# Patient Record
Sex: MALE | Race: WHITE | NOT HISPANIC OR LATINO | Employment: OTHER | URBAN - METROPOLITAN AREA
[De-identification: names, ages, dates, MRNs, and addresses within clinical notes are randomized per-mention and may not be internally consistent; named-entity substitution may affect disease eponyms.]

---

## 2017-01-11 DIAGNOSIS — I10 ESSENTIAL HYPERTENSION: ICD-10-CM

## 2017-01-11 RX ORDER — METOPROLOL SUCCINATE 100 MG/1
TABLET, EXTENDED RELEASE ORAL
Qty: 90 TAB | Refills: 3 | Status: SHIPPED | OUTPATIENT
Start: 2017-01-11 | End: 2017-12-20 | Stop reason: SDUPTHER

## 2017-02-16 ENCOUNTER — SLEEP CENTER VISIT (OUTPATIENT)
Dept: SLEEP MEDICINE | Facility: MEDICAL CENTER | Age: 72
End: 2017-02-16
Payer: MEDICARE

## 2017-02-16 VITALS
HEART RATE: 77 BPM | TEMPERATURE: 98.1 F | RESPIRATION RATE: 16 BRPM | BODY MASS INDEX: 30.23 KG/M2 | DIASTOLIC BLOOD PRESSURE: 86 MMHG | HEIGHT: 72 IN | WEIGHT: 223.2 LBS | OXYGEN SATURATION: 96 % | SYSTOLIC BLOOD PRESSURE: 143 MMHG

## 2017-02-16 DIAGNOSIS — I10 ESSENTIAL HYPERTENSION: ICD-10-CM

## 2017-02-16 DIAGNOSIS — G47.33 OSA (OBSTRUCTIVE SLEEP APNEA): ICD-10-CM

## 2017-02-16 PROCEDURE — 99214 OFFICE O/P EST MOD 30 MIN: CPT | Performed by: NURSE PRACTITIONER

## 2017-02-16 RX ORDER — CIPROFLOXACIN HYDROCHLORIDE 3.5 MG/ML
SOLUTION/ DROPS TOPICAL
COMMUNITY
Start: 2017-01-29 | End: 2018-05-25

## 2017-02-16 RX ORDER — PREDNISOLONE ACETATE 10 MG/ML
SUSPENSION/ DROPS OPHTHALMIC
COMMUNITY
Start: 2017-01-29 | End: 2018-05-25

## 2017-02-16 NOTE — PROGRESS NOTES
Chief Complaint   Patient presents with   • Follow-Up         HPI: This patient is a 71 y.o. male, who presents for annual follow-up of KAYLIE. Former PSG indicates severe KAYLIE with an RDI of 87.8, minimum oxygen saturation 75%. He was initially titrated on CPAP 6 cm H2O but pressures have been empirically increased. He is using CPAP 10 cm H2O, complaints, over the past 90 days shows 100% usage, average use per day of 8 hours and 15 minutes, AHI is been reduced to 5.2, no significant air leak. The patient states he continues to do well with his machine. He wakes feeling well rested. Sleeps well through the night, denies daytime hypersomnolence or morning headaches. He gets mask and supplies through [employer]. He believes he is due for a new machine this year, however His current machine is working well.    Past Medical History   Diagnosis Date   • HTN    • Dyslipidemia    • BPH    • chronic back pain    • Chronic neck pain    • KAYLIE (obstructive sleep apnea)      on CPAP   • Torn  meniscus 7/07     s/p arthroscopic surgery R knee10/07   • S/P carpal tunnel release      both wrists   • COPD (chronic obstructive pulmonary disease) (CMS-HCC)      mild   • OA (osteoarthritis) of knee      both knees   • Snoring    • Unspecified hemorrhagic conditions (CMS-[facility])      bruises easy   • Arrhythmia    • Impaired fasting blood sugar    • Groin pain, left lower quadrant 7/18/2013   • Arthritis      spine, thumbs   • Bronchitis 1980's   • Pneumonia 1988   • CATARACT      early cataracts both eyes   • Hypertension    • Gout    • Allergic rhinitis        Social History   Substance Use Topics   • Smoking status: Former Smoker -- 2.00 packs/day for 25 years     Types: Cigarettes     Quit date: 05/23/1988   • Smokeless tobacco: Never Used      Comment: quit 1998   • Alcohol Use: 0.0 oz/week     0 Standard drinks or equivalent per week      Comment: 1-2 glasses/day, beer on weekends       Family History   Problem Relation Age of Onset   •  "Cancer Mother      Leukemia       Current medications as of today   Current Outpatient Prescriptions   Medication Sig Dispense Refill   • ciprofloxacin (CILOXIN) 0.3 % Solution      • prednisoLONE acetate (PRED FORTE) 1 % Suspension      • metoprolol SR (TOPROL XL) 100 MG TABLET SR 24 HR TAKE 1 TABLET EVERY DAY 90 Tab 3   • fluticasone (FLONASE) 50 MCG/ACT nasal spray Spray 2 Sprays in nose every day. Each Nostril 3 Bottle 3   • amlodipine (NORVASC) 10 MG Tab Take 1 Tab by mouth every day. 90 Tab 3   • allopurinol (ZYLOPRIM) 300 MG Tab Take 1 Tab by mouth every day. 90 Tab 3   • atorvastatin (LIPITOR) 10 MG Tab Take 1 Tab by mouth every evening. 90 Tab 3   • losartan-hydrochlorothiazide (HYZAAR) 100-25 MG per tablet Take 1 Tab by mouth every day. 90 Tab 3   • valsartan-hydrochlorothiazide (DIOVAN-HCT) 320-12.5 MG per tablet Take 1 Tab by mouth every day.     • potassium chloride SA (K-DUR) 20 MEQ Tab CR TAKE 1 TABLET EVERY DAY 90 Tab 3   • metoprolol SR (TOPROL XL) 50 MG TABLET SR 24 HR Take 1 Tab by mouth every day. 90 Tab 3   • losartan-hydrochlorothiazide (HYZAAR) 100-12.5 MG per tablet Take 1 Tab by mouth every day. 90 Tab 3   • Multiple Vitamins-Minerals (OCUVITE ADULT 50+ PO) Take  by mouth every day.     • tramadol (ULTRAM) 50 MG TABS TAKE ONE TABLET BY MOUTH EVERY 4 HOURS AS NEEDED 30 Tab 0   • sildenafil citrate (VIAGRA) 100 MG tablet Take 1 Tab by mouth as needed for Erectile Dysfunction. 5 Tab 3     No current facility-administered medications for this visit.       Allergies: Nkda    Blood pressure 143/86, pulse 77, temperature 36.7 °C (98.1 °F), resp. rate 16, height 1.829 m (6' 0.01\"), weight 101.243 kg (223 lb 3.2 oz), SpO2 96 %.      ROS:   Constitutional: Denies fevers, chills, night sweats, weight loss or fatigue  HEENT: Denies earache, difficulty hearing, tinnitus, nasal congestion, hoarseness  Cardiovascular: Denies chest pain, tightness, palpitations, orthopnea or edema  Respiratory: Denies " cough, wheeze, dyspnea, hemoptysis  Sleep: See HPI  GI: Denies heartburn, dysphagia, nausea, abdominal pain, diarrhea or constipation  : Denies frequent urination, hematuria, discharge or painful urination  Musculoskeletal: Denies back pain, painful joints, sore muscles  Neurological: Denies weakness or headaches  Skin: No rashes    Physical exam:   Appearance: Well-nourished, well-developed, in no acute distress  HEENT: Normocephalic, atraumatic, white sclera, PERRLA  Respiratory: no intercostal retractions or accessory muscle use   Lungs auscultation: Clear to auscultation bilaterally  Cardiovascular: Regular rate rhythm no murmurs, rubs or gallops  Gait: Normal  Digits: No clubbing, cyanosis  Motor: No focal deficits  Orientation: Oriented to time, person and place    Diagnosis:  1. KAYLIE (obstructive sleep apnea)  DME MASK AND SUPPLIES   2. Essential hypertension     3. BMI 30.0-30.9,adult         Plan:  1. Rx for new mask and supplies  2. Clean mask and tubing weekly  3. Replace mask and supplies as insurance will allow  4. Follow up annually, sooner if needed

## 2017-02-16 NOTE — MR AVS SNAPSHOT
"        Sam Braswellmouna   2017 11:20 AM   Sleep Center Visit   MRN: [id number]    Department:  [facility]   Dept Phone:  [phone]    Description:  Male : 1945   Provider:  MARLEN Victor           Reason for Visit     Follow-Up           Allergies as of 2017     Allergen Noted Reactions    Nkda [No Known Drug Allergy] 2011         You were diagnosed with     KAYLIE (obstructive sleep apnea)   [959271]       Essential hypertension   [0515432]       BMI 30.0-30.9,adult   [802870]         Vital Signs     Blood Pressure Pulse Temperature Respirations Height Weight    143/86 mmHg 77 36.7 °C (98.1 °F) 16 1.829 m (6' 0.01\") 101.243 kg (223 lb 3.2 oz)    Body Mass Index Oxygen Saturation Smoking Status             30.26 kg/m2 96% Former Smoker         Basic Information     Date Of Birth Sex Race Ethnicity Preferred Language    1945 Male White Non- English      Your appointments     Mar 03, 2017  8:30 AM   Adult Draw/Collection with LAB [facility]   LAB - [place] (--)    [address]  [facility] [address] [phone]            Mar 08, 2017  1:00 PM   Established Patient with Bonnie Olsen M.D.   [facility] (--)    [address]  [address]  [facility] [address] [phone]           You will be receiving a confirmation call a few days before your appointment from our automated call confirmation system.              Problem List              ICD-10-CM Priority Class Noted - Resolved    Dyslipidemia E78.5   Unknown - Present    Chronic low back pain M54.5, G89.29   Unknown - Present    Chronic neck pain M54.2, G89.29   Unknown - Present    Hx of OA (Osteoarthritis) of Knee M17.9   Unknown - Present    KAYLIE (obstructive sleep apnea) G47.33   Unknown - Present    Torn meniscus S83.209A   Unknown - Present    S/P carpal tunnel release Z98.890   Unknown - Present    HTN (hypertension) I10   3/23/2010 - Present    Impaired fasting blood sugar " R73.01   3/7/2011 - Present    BPH (benign prostatic hyperplasia) N40.0   3/7/2011 - Present    Degeneration of lumbar or lumbosacral intervertebral disc M51.37   9/25/2012 - Present    Inguinal hernia K40.90   8/22/2013 - Present    Gout M10.9   5/8/2014 - Present    Essential hypertension I10   5/5/2016 - Present    BMI 30.0-30.9,adult Z68.30   2/16/2017 - Present      Health Maintenance        Date Due Completion Dates    IMM DTaP/Tdap/Td Vaccine (1 - Tdap) 7/12/2011 7/11/2011, 12/20/2004    COLONOSCOPY 10/12/2018 10/12/2016            Current Immunizations     13-VALENT PCV PREVNAR 5/5/2016    Hep A/HEP B Combined Vaccine (TwinRix) 6/14/2005, 12/20/2004    Hepatitis B Vaccine Non-Recombivax (Ped/Adol) 6/14/2005, 1/26/2005, 12/20/2004    Influenza TIV (IM) 10/4/2016, 10/4/2014, 10/18/2012    Influenza Vaccine 11/15/2011    Influenza Vaccine Adult HD 10/12/2015    Pneumococcal Vaccine (UF)Historical Data 12/20/2004    Pneumococcal polysaccharide vaccine (PPSV-23) 12/15/2011  8:11 PM    SHINGLES VACCINE 1/21/2013    TD Vaccine 7/11/2011  8:20 PM, 12/20/2004      Below and/or attached are the medications your provider expects you to take. Review all of your home medications and newly ordered medications with your provider and/or pharmacist. Follow medication instructions as directed by your provider and/or pharmacist. Please keep your medication list with you and share with your provider. Update the information when medications are discontinued, doses are changed, or new medications (including over-the-counter products) are added; and carry medication information at all times in the event of emergency situations     Allergies:  NKDA - (reactions not documented)               Medications  Valid as of: February 16, 2017 - 12:32 PM    Generic Name Brand Name Tablet Size Instructions for use    Allopurinol (Tab) ZYLOPRIM 300 MG Take 1 Tab by mouth every day.        AmLODIPine Besylate (Tab) NORVASC 10 MG Take 1 Tab by  mouth every day.        Atorvastatin Calcium (Tab) LIPITOR 10 MG Take 1 Tab by mouth every evening.        Ciprofloxacin HCl (Solution) CILOXIN 0.3 %         Fluticasone Propionate (Suspension) FLONASE 50 MCG/ACT Spray 2 Sprays in nose every day. Each Nostril        Losartan Potassium-HCTZ (Tab) HYZAAR 100-12.5 MG Take 1 Tab by mouth every day.        Losartan Potassium-HCTZ (Tab) HYZAAR 100-25 MG Take 1 Tab by mouth every day.        Metoprolol Succinate (TABLET SR 24 HR) TOPROL XL 50 MG Take 1 Tab by mouth every day.        Metoprolol Succinate (TABLET SR 24 HR) TOPROL  MG TAKE 1 TABLET EVERY DAY        Multiple Vitamins-Minerals   Take  by mouth every day.        Potassium Chloride Carla CR (Tab CR) Kdur 20 MEQ TAKE 1 TABLET EVERY DAY        PrednisoLONE Acetate (Suspension) PRED FORTE 1 %         Sildenafil Citrate (Tab) VIAGRA 100 MG Take 1 Tab by mouth as needed for Erectile Dysfunction.        TraMADol HCl (Tab) ULTRAM 50 MG TAKE ONE TABLET BY MOUTH EVERY 4 HOURS AS NEEDED        Valsartan-Hydrochlorothiazide (Tab) DIOVAN--12.5 MG Take 1 Tab by mouth every day.        .                 Medicines prescribed today were sent to:     [facility] - [place], [place] - [address]    [address] [address]    Phone: [phone] Fax: [phone]    Open 24 Hours?: No    [facility] [address] [place], [place] [address] [address] [address]    Phone: [phone] Fax: [phone]    Open 24 Hours?: No      Medication refill instructions:       If your prescription bottle indicates you have medication refills left, it is not necessary to call your provider’s office. Please contact your pharmacy and they will refill your medication.    If your prescription bottle indicates you do not have any refills left, you may request refills at any time through one of the following ways: The online [employer] system (except Urgent Care), by calling  your provider’s office, or by asking your pharmacy to contact your provider’s office with a refill request. Medication refills are processed only during regular business hours and may not be available until the next business day. Your provider may request additional information or to have a follow-up visit with you prior to refilling your medication.   *Please Note: Medication refills are assigned a new Rx number when refilled electronically. Your pharmacy may indicate that no refills were authorized even though a new prescription for the same medication is available at the pharmacy. Please request the medicine by name with the pharmacy before contacting your provider for a refill.        Instructions    1. Rx for new mask and supplies  2. Clean mask and tubing weekly  3. Replace mask and supplies as insurance will allow  4. Follow up annually, sooner if needed            [employer] Access Code: Activation code not generated  Current [employer] Status: Active

## 2017-02-16 NOTE — PATIENT INSTRUCTIONS
1. Rx for new mask and supplies  2. Clean mask and tubing weekly  3. Replace mask and supplies as insurance will allow  4. Follow up annually, sooner if needed

## 2017-02-23 ENCOUNTER — PATIENT OUTREACH (OUTPATIENT)
Dept: HEALTH INFORMATION MANAGEMENT | Facility: OTHER | Age: 72
End: 2017-02-23

## 2017-02-23 NOTE — PROGRESS NOTES
Attempt #:1     Annual Wellness Visit Scheduling  1. Scheduling Status:Not Scheduled. Patient states they see PCP regularly     Care Gap Scheduling      Health Maintenance Due   Topic Date Due   • IMM DTaP/Tdap/Td Vaccine (1 - Tdap) TD given on 7/11/2011   • Annual Wellness Visit  Declined         MyChart Activation: already active

## 2017-03-03 ENCOUNTER — HOSPITAL ENCOUNTER (OUTPATIENT)
Dept: LAB | Facility: MEDICAL CENTER | Age: 72
End: 2017-03-03
Attending: FAMILY MEDICINE
Payer: MEDICARE

## 2017-03-03 DIAGNOSIS — J30.2 OTHER SEASONAL ALLERGIC RHINITIS: ICD-10-CM

## 2017-03-03 DIAGNOSIS — I10 ESSENTIAL HYPERTENSION: ICD-10-CM

## 2017-03-03 DIAGNOSIS — M10.9 GOUT, ARTHRITIS: ICD-10-CM

## 2017-03-03 DIAGNOSIS — R73.01 IMPAIRED FASTING BLOOD SUGAR: ICD-10-CM

## 2017-03-03 DIAGNOSIS — E78.5 DYSLIPIDEMIA: ICD-10-CM

## 2017-03-03 LAB
ALBUMIN SERPL BCP-MCNC: 4.5 G/DL (ref 3.2–4.9)
ALBUMIN/GLOB SERPL: 1.5 G/DL
ALP SERPL-CCNC: 50 U/L (ref 30–99)
ALT SERPL-CCNC: 25 U/L (ref 2–50)
ANION GAP SERPL CALC-SCNC: 6 MMOL/L (ref 0–11.9)
AST SERPL-CCNC: 16 U/L (ref 12–45)
BILIRUB SERPL-MCNC: 1.1 MG/DL (ref 0.1–1.5)
BUN SERPL-MCNC: 18 MG/DL (ref 8–22)
CALCIUM SERPL-MCNC: 9.9 MG/DL (ref 8.5–10.5)
CHLORIDE SERPL-SCNC: 104 MMOL/L (ref 96–112)
CHOLEST SERPL-MCNC: 142 MG/DL (ref 100–199)
CO2 SERPL-SCNC: 29 MMOL/L (ref 20–33)
CREAT SERPL-MCNC: 0.8 MG/DL (ref 0.5–1.4)
EST. AVERAGE GLUCOSE BLD GHB EST-MCNC: 108 MG/DL
GLOBULIN SER CALC-MCNC: 3 G/DL (ref 1.9–3.5)
GLUCOSE SERPL-MCNC: 99 MG/DL (ref 65–99)
HBA1C MFR BLD: 5.4 % (ref 0–5.6)
HDLC SERPL-MCNC: 42 MG/DL
LDLC SERPL CALC-MCNC: 84 MG/DL
POTASSIUM SERPL-SCNC: 3.8 MMOL/L (ref 3.6–5.5)
PROT SERPL-MCNC: 7.5 G/DL (ref 6–8.2)
SODIUM SERPL-SCNC: 139 MMOL/L (ref 135–145)
TRIGL SERPL-MCNC: 78 MG/DL (ref 0–149)

## 2017-03-03 PROCEDURE — 83036 HEMOGLOBIN GLYCOSYLATED A1C: CPT

## 2017-03-03 PROCEDURE — 36415 COLL VENOUS BLD VENIPUNCTURE: CPT

## 2017-03-03 PROCEDURE — 80061 LIPID PANEL: CPT

## 2017-03-03 PROCEDURE — 80053 COMPREHEN METABOLIC PANEL: CPT

## 2017-03-08 ENCOUNTER — OFFICE VISIT (OUTPATIENT)
Dept: MEDICAL GROUP | Facility: PHYSICIAN GROUP | Age: 72
End: 2017-03-08
Payer: MEDICARE

## 2017-03-08 VITALS
DIASTOLIC BLOOD PRESSURE: 68 MMHG | HEART RATE: 68 BPM | OXYGEN SATURATION: 96 % | WEIGHT: 222 LBS | TEMPERATURE: 98.6 F | SYSTOLIC BLOOD PRESSURE: 132 MMHG | BODY MASS INDEX: 30.07 KG/M2 | RESPIRATION RATE: 18 BRPM | HEIGHT: 72 IN

## 2017-03-08 DIAGNOSIS — M10.9 GOUT, ARTHRITIS: ICD-10-CM

## 2017-03-08 DIAGNOSIS — E78.5 DYSLIPIDEMIA: ICD-10-CM

## 2017-03-08 DIAGNOSIS — I10 ESSENTIAL HYPERTENSION: ICD-10-CM

## 2017-03-08 DIAGNOSIS — Z12.5 SCREENING FOR PROSTATE CANCER: ICD-10-CM

## 2017-03-08 DIAGNOSIS — R73.01 IMPAIRED FASTING BLOOD SUGAR: ICD-10-CM

## 2017-03-08 PROCEDURE — 4040F PNEUMOC VAC/ADMIN/RCVD: CPT | Performed by: FAMILY MEDICINE

## 2017-03-08 PROCEDURE — G8432 DEP SCR NOT DOC, RNG: HCPCS | Performed by: FAMILY MEDICINE

## 2017-03-08 PROCEDURE — 99214 OFFICE O/P EST MOD 30 MIN: CPT | Performed by: FAMILY MEDICINE

## 2017-03-08 PROCEDURE — 3017F COLORECTAL CA SCREEN DOC REV: CPT | Performed by: FAMILY MEDICINE

## 2017-03-08 PROCEDURE — 1101F PT FALLS ASSESS-DOCD LE1/YR: CPT | Performed by: FAMILY MEDICINE

## 2017-03-08 PROCEDURE — G8419 CALC BMI OUT NRM PARAM NOF/U: HCPCS | Performed by: FAMILY MEDICINE

## 2017-03-08 PROCEDURE — G8482 FLU IMMUNIZE ORDER/ADMIN: HCPCS | Performed by: FAMILY MEDICINE

## 2017-03-08 PROCEDURE — 1036F TOBACCO NON-USER: CPT | Performed by: FAMILY MEDICINE

## 2017-03-08 ASSESSMENT — PATIENT HEALTH QUESTIONNAIRE - PHQ9: CLINICAL INTERPRETATION OF PHQ2 SCORE: 0

## 2017-03-08 NOTE — PROGRESS NOTES
"Subjective:      Sam Ayala is a 71 y.o. male who presents with follow-up of medical problems.          HPI     Patient is here for follow-up of his medical problems.    In terms of his hypertension, he continues to take his medications which are metoprolol, lisinopril/HCTZ, amlodipine. He has been monitoring his blood pressures at home and they have been running from 109-132/54-68. In the past we have seen fluctuations of his blood pressure reading with blood pressure running high associated increase in alcohol consumption. He said he has been avoiding alcohol use. He also has been watching his diet better. He and his wife vacation in [place] a few times a year and when they stay there he tends to consume more alcohol and not very careful with his diet. These usually result in elevation of the blood pressure when he comes home.    In terms of the Dyslipidemia, he continues to take atorvastatin without any side effects. Blood work was done before his visit.    We also follow him for repeat fasting blood sugar. He manages this by watching his diet.    In terms of his gout, he has not had any acute attack in a long time. He takes allopurinol. The last uric acid level was 4.6 in January 2016.    Past medical history, past surgical history, family history reviewed-no changes    Social history reviewed-no changes    Allergies reviewed-no changes    Medications reviewed-no changes    ROS     Review of systems as per history of present illness, the rest are negative.       Objective:     /68 mmHg  Pulse 68  Temp(Src) 37 °C (98.6 °F)  Resp 18  Ht 1.829 m (6' 0.01\")  Wt 100.699 kg (222 lb)  BMI 30.10 kg/m2  SpO2 96%     Physical Exam     Examined alert, awake, oriented, not in distress    Neck-supple, no lymphadenopathy, no thyromegaly  Lungs-clear to auscultation, no rales, no wheezes  Heart-regular rate and rhythm, no murmur  Extremities-no edema, clubbing, cyanosis     Hospital Outpatient Visit on " 03/03/2017   Component Date Value   • Cholesterol,Tot 03/03/2017 142    • Triglycerides 03/03/2017 78    • HDL 03/03/2017 42    • LDL 03/03/2017 84    • Sodium 03/03/2017 139    • Potassium 03/03/2017 3.8    • Chloride 03/03/2017 104    • Co2 03/03/2017 29    • Anion Gap 03/03/2017 6.0    • Glucose 03/03/2017 99    • Bun 03/03/2017 18    • Creatinine 03/03/2017 0.80    • Calcium 03/03/2017 9.9    • AST(SGOT) 03/03/2017 16    • ALT(SGPT) 03/03/2017 25    • Alkaline Phosphatase 03/03/2017 50    • Total Bilirubin 03/03/2017 1.1    • Albumin 03/03/2017 4.5    • Total Protein 03/03/2017 7.5    • Globulin 03/03/2017 3.0    • A-G Ratio 03/03/2017 1.5    • Glycohemoglobin 03/03/2017 5.4    • Est Avg Glucose 03/03/2017 108    • GFR If  03/03/2017 >60    • GFR If Non  Ameri* 03/03/2017 >60         Assessment/Plan:     1. Essential hypertension  Well-controlled on his medications. He has been avoiding alcohol and he has been more watchful with his diet. Advised to continue to follow healthy diet and to avoid alcohol including the times when he vacations in [place]. We will do follow-up blood work in 6 months.  - LIPID PROFILE; Future  - COMP METABOLIC PANEL; Future  - HEMOGLOBIN A1C; Future  - CBC WITH DIFFERENTIAL; Future  - PROSTATE SPECIFIC AG SCREENING; Future  - URIC ACID; Future    2. Dyslipidemia  All at target on atorvastatin. We will do follow-up blood work in 6 months.  - LIPID PROFILE; Future  - COMP METABOLIC PANEL; Future  - HEMOGLOBIN A1C; Future  - CBC WITH DIFFERENTIAL; Future  - PROSTATE SPECIFIC AG SCREENING; Future  - URIC ACID; Future    3. Impaired fasting blood sugar  His blood sugar is down to normal. His hemoglobin A1c is nondiabetic level and is normal. He will continue to watch his diet in terms of avoidance of sweets and decreasing the carbohydrates.  - LIPID PROFILE; Future  - COMP METABOLIC PANEL; Future  - HEMOGLOBIN A1C; Future  - CBC WITH DIFFERENTIAL; Future  - PROSTATE  SPECIFIC AG SCREENING; Future  - URIC ACID; Future    4. Gout, arthritis  He has not had a gout attack in a long time on allopurinol. We will do follow-up uric acid next visit.  - LIPID PROFILE; Future  - COMP METABOLIC PANEL; Future  - HEMOGLOBIN A1C; Future  - CBC WITH DIFFERENTIAL; Future  - PROSTATE SPECIFIC AG SCREENING; Future  - URIC ACID; Future    5. Screening for prostate cancer  We will do screening PSA with the next blood work.  - LIPID PROFILE; Future  - COMP METABOLIC PANEL; Future  - HEMOGLOBIN A1C; Future  - CBC WITH DIFFERENTIAL; Future  - PROSTATE SPECIFIC AG SCREENING; Future  - URIC ACID; Future      Please note that this dictation was created using voice recognition software. I have worked with consultants from the vendor as well as technical experts from "[employer]"[facility]  [employer] to optimize the interface. I have made every reasonable attempt to correct obvious errors, but I expect that there are errors of grammar and possibly content I did not discover before finalizing the note.

## 2017-03-08 NOTE — MR AVS SNAPSHOT
"Sam Ayala   3/8/2017 1:00 PM   Office Visit   MRN: [id number]    Department:  [facility]   Dept Phone:  [phone]    Description:  Male : 1945   Provider:  Bonnie Olsen M.D.           Allergies as of 3/8/2017     Allergen Noted Reactions    Nkda [No Known Drug Allergy] 2011         You were diagnosed with     Essential hypertension   [1088450]       Dyslipidemia   [924576]       Impaired fasting blood sugar   [043604]       Gout, arthritis   [024250]       Screening for prostate cancer   [723745]         Vital Signs     Blood Pressure Pulse Temperature Respirations Height Weight    132/68 mmHg 68 37 °C (98.6 °F) 18 1.829 m (6' 0.01\") 100.699 kg (222 lb)    Body Mass Index Oxygen Saturation Smoking Status             30.10 kg/m2 96% Former Smoker         Basic Information     Date Of Birth Sex Race Ethnicity Preferred Language    1945 Male White Non- English      Your appointments     Sep 26, 2017  1:00 PM   Established Patient with Bonnie Olsen M.D.   [facility] (--)    [address]  [address]  [facility] [id number] [phone]           You will be receiving a confirmation call a few days before your appointment from our automated call confirmation system.              Problem List              ICD-10-CM Priority Class Noted - Resolved    Dyslipidemia E78.5   Unknown - Present    Chronic low back pain M54.5, G89.29   Unknown - Present    Chronic neck pain M54.2, G89.29   Unknown - Present    Hx of OA (Osteoarthritis) of Knee M17.9   Unknown - Present    KAYLIE (obstructive sleep apnea) G47.33   Unknown - Present    Torn meniscus S83.209A   Unknown - Present    S/P carpal tunnel release Z98.890   Unknown - Present    HTN (hypertension) I10   3/23/2010 - Present    Impaired fasting blood sugar R73.01   3/7/2011 - Present    BPH (benign prostatic hyperplasia) N40.0   3/7/2011 - Present    Degeneration of lumbar or lumbosacral intervertebral disc " M51.37   9/25/2012 - Present    Inguinal hernia K40.90   8/22/2013 - Present    Gout M10.9   5/8/2014 - Present    Essential hypertension I10   5/5/2016 - Present    BMI 30.0-30.9,adult Z68.30   2/16/2017 - Present      Health Maintenance        Date Due Completion Dates    IMM DTaP/Tdap/Td Vaccine (1 - Tdap) 7/12/2011 7/11/2011, 12/20/2004    COLONOSCOPY 10/12/2018 10/12/2016            Current Immunizations     13-VALENT PCV PREVNAR 5/5/2016    Hep A/HEP B Combined Vaccine (TwinRix) 6/14/2005, 12/20/2004    Hepatitis B Vaccine Non-Recombivax (Ped/Adol) 6/14/2005, 1/26/2005, 12/20/2004    Influenza TIV (IM) 10/4/2016, 10/4/2014, 10/18/2012    Influenza Vaccine 11/15/2011    Influenza Vaccine Adult HD 10/12/2015    Pneumococcal Vaccine (UF)Historical Data 12/20/2004    Pneumococcal polysaccharide vaccine (PPSV-23) 12/15/2011  8:11 PM    SHINGLES VACCINE 1/21/2013    TD Vaccine 7/11/2011  8:20 PM, 12/20/2004      Below and/or attached are the medications your provider expects you to take. Review all of your home medications and newly ordered medications with your provider and/or pharmacist. Follow medication instructions as directed by your provider and/or pharmacist. Please keep your medication list with you and share with your provider. Update the information when medications are discontinued, doses are changed, or new medications (including over-the-counter products) are added; and carry medication information at all times in the event of emergency situations     Allergies:  NKDA - (reactions not documented)               Medications  Valid as of: March 08, 2017 -  1:31 PM    Generic Name Brand Name Tablet Size Instructions for use    Allopurinol (Tab) ZYLOPRIM 300 MG Take 1 Tab by mouth every day.        AmLODIPine Besylate (Tab) NORVASC 10 MG Take 1 Tab by mouth every day.        Atorvastatin Calcium (Tab) LIPITOR 10 MG Take 1 Tab by mouth every evening.        Ciprofloxacin HCl (Solution) CILOXIN 0.3 %          Fluticasone Propionate (Suspension) FLONASE 50 MCG/ACT Spray 2 Sprays in nose every day. Each Nostril        Losartan Potassium-HCTZ (Tab) HYZAAR 100-12.5 MG Take 1 Tab by mouth every day.        Losartan Potassium-HCTZ (Tab) HYZAAR 100-25 MG Take 1 Tab by mouth every day.        Metoprolol Succinate (TABLET SR 24 HR) TOPROL XL 50 MG Take 1 Tab by mouth every day.        Metoprolol Succinate (TABLET SR 24 HR) TOPROL  MG TAKE 1 TABLET EVERY DAY        Multiple Vitamins-Minerals   Take  by mouth every day.        Potassium Chloride Carla CR (Tab CR) Kdur 20 MEQ TAKE 1 TABLET EVERY DAY        PrednisoLONE Acetate (Suspension) PRED FORTE 1 %         Sildenafil Citrate (Tab) VIAGRA 100 MG Take 1 Tab by mouth as needed for Erectile Dysfunction.        TraMADol HCl (Tab) ULTRAM 50 MG TAKE ONE TABLET BY MOUTH EVERY 4 HOURS AS NEEDED        Valsartan-Hydrochlorothiazide (Tab) DIOVAN--12.5 MG Take 1 Tab by mouth every day.        .                 Medicines prescribed today were sent to:     [facility] - [facility] [address] [facility] [address] [facility]    [address] [facility] [address]    Phone: [phone] Fax: [phone]    Open 24 Hours?: No    [facility] [address] [place] [id number]    Phone: [phone] Fax: [phone]    Open 24 Hours?: No      Medication refill instructions:       If your prescription bottle indicates you have medication refills left, it is not necessary to call your provider’s office. Please contact your pharmacy and they will refill your medication.    If your prescription bottle indicates you do not have any refills left, you may request refills at any time through one of the following ways: The online [employer] system (except Urgent Care), by calling your provider’s office, or by asking your pharmacy to contact your provider’s office with a refill request. Medication refills are processed only during  regular business hours and may not be available until the next business day. Your provider may request additional information or to have a follow-up visit with you prior to refilling your medication.   *Please Note: Medication refills are assigned a new Rx number when refilled electronically. Your pharmacy may indicate that no refills were authorized even though a new prescription for the same medication is available at the pharmacy. Please request the medicine by name with the pharmacy before contacting your provider for a refill.        Your To Do List     Future Labs/Procedures Complete By Expires    CBC WITH DIFFERENTIAL  As directed 3/9/2018    COMP METABOLIC PANEL  As directed 3/9/2018    HEMOGLOBIN A1C  As directed 3/9/2018    LIPID PROFILE  As directed 3/9/2018    PROSTATE SPECIFIC AG SCREENING  As directed 3/9/2018    URIC ACID  As directed 3/9/2018         [employer] Access Code: Activation code not generated  Current [employer] Status: Active

## 2017-04-11 RX ORDER — POTASSIUM CHLORIDE 20 MEQ/1
TABLET, EXTENDED RELEASE ORAL
Qty: 90 TAB | Refills: 1 | Status: SHIPPED | OUTPATIENT
Start: 2017-04-11 | End: 2017-09-06 | Stop reason: SDUPTHER

## 2017-04-17 NOTE — PROGRESS NOTES
Attempt #:2    Verify PCP: yes    Communication Preference Obtained: yes     Review Care Team: yes    Annual Wellness Visit Scheduling  1. Scheduling Status:Not Scheduled. Patient states they are not interested       Care Gap Scheduling (Attempt to Schedule EACH Overdue Care Gap!)     Health Maintenance Due   Topic Date Due   • IMM DTaP/Tdap/Td Vaccine (1 - Tdap) 07/12/2011   • Annual Wellness Visit  08/31/2016         [employer] Activation: already active  [employer] Arnulfo: no  Virtual Visits: no  Opt In to Text Messages: no

## 2017-07-06 RX ORDER — LOSARTAN POTASSIUM AND HYDROCHLOROTHIAZIDE 25; 100 MG/1; MG/1
TABLET ORAL
Qty: 90 TAB | Refills: 3 | Status: SHIPPED | OUTPATIENT
Start: 2017-07-06 | End: 2018-08-28 | Stop reason: SDUPTHER

## 2017-07-06 RX ORDER — ALLOPURINOL 300 MG/1
TABLET ORAL
Qty: 90 TAB | Refills: 3 | Status: SHIPPED | OUTPATIENT
Start: 2017-07-06 | End: 2018-08-28 | Stop reason: SDUPTHER

## 2017-07-06 RX ORDER — FLUTICASONE PROPIONATE 50 MCG
SPRAY, SUSPENSION (ML) NASAL
Qty: 48 G | Refills: 3 | Status: SHIPPED | OUTPATIENT
Start: 2017-07-06 | End: 2018-08-28 | Stop reason: SDUPTHER

## 2017-07-06 RX ORDER — AMLODIPINE BESYLATE 10 MG/1
TABLET ORAL
Qty: 90 TAB | Refills: 3 | Status: SHIPPED | OUTPATIENT
Start: 2017-07-06 | End: 2018-08-28 | Stop reason: SDUPTHER

## 2017-10-05 ENCOUNTER — HOSPITAL ENCOUNTER (OUTPATIENT)
Dept: LAB | Facility: MEDICAL CENTER | Age: 72
End: 2017-10-05
Attending: FAMILY MEDICINE
Payer: MEDICARE

## 2017-10-05 DIAGNOSIS — I10 ESSENTIAL HYPERTENSION: ICD-10-CM

## 2017-10-05 DIAGNOSIS — M10.9 GOUT, ARTHRITIS: ICD-10-CM

## 2017-10-05 DIAGNOSIS — Z12.5 SCREENING FOR PROSTATE CANCER: ICD-10-CM

## 2017-10-05 DIAGNOSIS — E78.5 DYSLIPIDEMIA: ICD-10-CM

## 2017-10-05 DIAGNOSIS — R73.01 IMPAIRED FASTING BLOOD SUGAR: ICD-10-CM

## 2017-10-05 LAB
ALBUMIN SERPL BCP-MCNC: 4.4 G/DL (ref 3.2–4.9)
ALBUMIN/GLOB SERPL: 1.6 G/DL
ALP SERPL-CCNC: 56 U/L (ref 30–99)
ALT SERPL-CCNC: 31 U/L (ref 2–50)
ANION GAP SERPL CALC-SCNC: 8 MMOL/L (ref 0–11.9)
AST SERPL-CCNC: 21 U/L (ref 12–45)
BASOPHILS # BLD AUTO: 1 % (ref 0–1.8)
BASOPHILS # BLD: 0.06 K/UL (ref 0–0.12)
BILIRUB SERPL-MCNC: 0.7 MG/DL (ref 0.1–1.5)
BUN SERPL-MCNC: 13 MG/DL (ref 8–22)
CALCIUM SERPL-MCNC: 9.5 MG/DL (ref 8.5–10.5)
CHLORIDE SERPL-SCNC: 107 MMOL/L (ref 96–112)
CHOLEST SERPL-MCNC: 124 MG/DL (ref 100–199)
CO2 SERPL-SCNC: 28 MMOL/L (ref 20–33)
CREAT SERPL-MCNC: 0.8 MG/DL (ref 0.5–1.4)
EOSINOPHIL # BLD AUTO: 0.09 K/UL (ref 0–0.51)
EOSINOPHIL NFR BLD: 1.6 % (ref 0–6.9)
ERYTHROCYTE [DISTWIDTH] IN BLOOD BY AUTOMATED COUNT: 41.7 FL (ref 35.9–50)
EST. AVERAGE GLUCOSE BLD GHB EST-MCNC: 126 MG/DL
GFR SERPL CREATININE-BSD FRML MDRD: >60 ML/MIN/1.73 M 2
GLOBULIN SER CALC-MCNC: 2.8 G/DL (ref 1.9–3.5)
GLUCOSE SERPL-MCNC: 111 MG/DL (ref 65–99)
HBA1C MFR BLD: 6 % (ref 0–5.6)
HCT VFR BLD AUTO: 44.6 % (ref 42–52)
HDLC SERPL-MCNC: 42 MG/DL
HGB BLD-MCNC: 15.2 G/DL (ref 14–18)
IMM GRANULOCYTES # BLD AUTO: 0.01 K/UL (ref 0–0.11)
IMM GRANULOCYTES NFR BLD AUTO: 0.2 % (ref 0–0.9)
LDLC SERPL CALC-MCNC: 65 MG/DL
LYMPHOCYTES # BLD AUTO: 1.64 K/UL (ref 1–4.8)
LYMPHOCYTES NFR BLD: 28.3 % (ref 22–41)
MCH RBC QN AUTO: 30.7 PG (ref 27–33)
MCHC RBC AUTO-ENTMCNC: 34.1 G/DL (ref 33.7–35.3)
MCV RBC AUTO: 90.1 FL (ref 81.4–97.8)
MONOCYTES # BLD AUTO: 0.51 K/UL (ref 0–0.85)
MONOCYTES NFR BLD AUTO: 8.8 % (ref 0–13.4)
NEUTROPHILS # BLD AUTO: 3.49 K/UL (ref 1.82–7.42)
NEUTROPHILS NFR BLD: 60.1 % (ref 44–72)
NRBC # BLD AUTO: 0 K/UL
NRBC BLD AUTO-RTO: 0 /100 WBC
PLATELET # BLD AUTO: 240 K/UL (ref 164–446)
PMV BLD AUTO: 10.1 FL (ref 9–12.9)
POTASSIUM SERPL-SCNC: 4.1 MMOL/L (ref 3.6–5.5)
PROT SERPL-MCNC: 7.2 G/DL (ref 6–8.2)
PSA SERPL-MCNC: 1.12 NG/ML (ref 0–4)
RBC # BLD AUTO: 4.95 M/UL (ref 4.7–6.1)
SODIUM SERPL-SCNC: 143 MMOL/L (ref 135–145)
TRIGL SERPL-MCNC: 84 MG/DL (ref 0–149)
URATE SERPL-MCNC: 4.7 MG/DL (ref 2.5–8.3)
WBC # BLD AUTO: 5.8 K/UL (ref 4.8–10.8)

## 2017-10-05 PROCEDURE — 80053 COMPREHEN METABOLIC PANEL: CPT

## 2017-10-05 PROCEDURE — 85025 COMPLETE CBC W/AUTO DIFF WBC: CPT

## 2017-10-05 PROCEDURE — 36415 COLL VENOUS BLD VENIPUNCTURE: CPT

## 2017-10-05 PROCEDURE — 84550 ASSAY OF BLOOD/URIC ACID: CPT

## 2017-10-05 PROCEDURE — 80061 LIPID PANEL: CPT

## 2017-10-05 PROCEDURE — 83036 HEMOGLOBIN GLYCOSYLATED A1C: CPT | Mod: GA

## 2017-10-05 PROCEDURE — 84153 ASSAY OF PSA TOTAL: CPT | Mod: GA

## 2017-10-11 ENCOUNTER — OFFICE VISIT (OUTPATIENT)
Dept: MEDICAL GROUP | Facility: PHYSICIAN GROUP | Age: 72
End: 2017-10-11
Payer: MEDICARE

## 2017-10-11 VITALS
HEART RATE: 67 BPM | BODY MASS INDEX: 30.13 KG/M2 | WEIGHT: 222.44 LBS | OXYGEN SATURATION: 96 % | TEMPERATURE: 98 F | DIASTOLIC BLOOD PRESSURE: 60 MMHG | HEIGHT: 72 IN | SYSTOLIC BLOOD PRESSURE: 134 MMHG

## 2017-10-11 DIAGNOSIS — I10 ESSENTIAL HYPERTENSION: ICD-10-CM

## 2017-10-11 DIAGNOSIS — R73.01 IMPAIRED FASTING BLOOD SUGAR: ICD-10-CM

## 2017-10-11 DIAGNOSIS — E66.9 OBESITY (BMI 30-39.9): ICD-10-CM

## 2017-10-11 DIAGNOSIS — E78.5 DYSLIPIDEMIA: ICD-10-CM

## 2017-10-11 DIAGNOSIS — M10.9 GOUT, ARTHRITIS: ICD-10-CM

## 2017-10-11 PROCEDURE — 99214 OFFICE O/P EST MOD 30 MIN: CPT | Performed by: FAMILY MEDICINE

## 2017-10-11 NOTE — PROGRESS NOTES
Subjective:      Mel Maier is a 72 y.o. male who presents with Follow-Up            HPI     Patient returns for follow-up of his medical problems.    In terms of his hypertension, this is under control on metoprolol, lisinopril/HCTZ and amlodipine. He denies any side effects from the medication.    For his dyslipidemia, he continues to take atorvastatin without myalgias.    We continue to follow him for impaired fasting blood sugar. He said lately has been eating sweets.    In terms of the doubt, he has not had any acute flareup in a while. He is on allopurinol.    Patient already got his high-dose shot last month. He is up-to-date with all the rest of the shots.    Past medical history, past surgical history, family history reviewed-no changes    Social history reviewed-no changes    Allergies reviewed-no changes    Medications reviewed-no changes    ROS     Review of systems as per history of present illness, the rest are negative.       Objective:     /60   Pulse 67   Temp 36.7 °C (98 °F)   Ht 1.829 m (6')   Wt 100.9 kg (222 lb 7.1 oz)   SpO2 96%   BMI 30.17 kg/m²      Physical Exam      Examined alert, awake, oriented, not in distress    Neck-supple, no lymphadenopathy, no thyromegaly  Lungs-clear to auscultation, no rales, no wheezes  Heart-regular rate and rhythm, no murmur  Extremities-no edema, clubbing, cyanosis     Results for MEL MAIER (MRN [id number]) as of 10/11/2017 13:50   Ref. Range 3/3/2017 08:28 10/5/2017 07:40   Sodium Latest Ref Range: 135 - 145 mmol/L 139 143   Potassium Latest Ref Range: 3.6 - 5.5 mmol/L 3.8 4.1   Chloride Latest Ref Range: 96 - 112 mmol/L 104 107   Co2 Latest Ref Range: 20 - 33 mmol/L 29 28   Anion Gap Latest Ref Range: 0.0 - 11.9  6.0 8.0   Glucose Latest Ref Range: 65 - 99 mg/dL 99 111 (H)   Bun Latest Ref Range: 8 - 22 mg/dL 18 13   Creatinine Latest Ref Range: 0.50 - 1.40 mg/dL 0.80 0.80   GFR If  Latest Ref Range: >60  mL/min/1.73 m 2 >60 >60   GFR If Non  Latest Ref Range: >60 mL/min/1.73 m 2 >60 >60   Calcium Latest Ref Range: 8.5 - 10.5 mg/dL 9.9 9.5   AST(SGOT) Latest Ref Range: 12 - 45 U/L 16 21   ALT(SGPT) Latest Ref Range: 2 - 50 U/L 25 31   Alkaline Phosphatase Latest Ref Range: 30 - 99 U/L 50 56   Total Bilirubin Latest Ref Range: 0.1 - 1.5 mg/dL 1.1 0.7   Albumin Latest Ref Range: 3.2 - 4.9 g/dL 4.5 4.4   Total Protein Latest Ref Range: 6.0 - 8.2 g/dL 7.5 7.2   Globulin Latest Ref Range: 1.9 - 3.5 g/dL 3.0 2.8   A-G Ratio Latest Units: g/dL 1.5 1.6   Uric Acid Latest Ref Range: 2.5 - 8.3 mg/dL  4.7   Glycohemoglobin Latest Ref Range: 0.0 - 5.6 % 5.4 6.0 (H)   Estim. Avg Glu Latest Units: mg/dL 108 126   Cholesterol,Tot Latest Ref Range: 100 - 199 mg/dL 142 124   Triglycerides Latest Ref Range: 0 - 149 mg/dL 78 84   HDL Latest Ref Range: >=40 mg/dL 42 42   LDL Latest Ref Range: <100 mg/dL 84 65      Results for LIVIER MEL PADILLA (MRN [id number]) as of 10/11/2017 13:50   Ref. Range 4/17/2016 11:10 10/5/2017 07:40   WBC Latest Ref Range: 4.8 - 10.8 K/uL 6.5 5.8   RBC Latest Ref Range: 4.70 - 6.10 M/uL 5.18 4.95   Hemoglobin Latest Ref Range: 14.0 - 18.0 g/dL 15.3 15.2   Hematocrit Latest Ref Range: 42.0 - 52.0 % 44.7 44.6   MCV Latest Ref Range: 81.4 - 97.8 fL 86.3 90.1   MCH Latest Ref Range: 27.0 - 33.0 pg 29.5 30.7   MCHC Latest Ref Range: 33.7 - 35.3 g/dL 34.2 34.1   RDW Latest Ref Range: 35.9 - 50.0 fL 39.7 41.7   Platelet Count Latest Ref Range: 164 - 446 K/uL 234 240   MPV Latest Ref Range: 9.0 - 12.9 fL 9.5 10.1   Neutrophils-Polys Latest Ref Range: 44.00 - 72.00 % 64.60 60.10   Neutrophils (Absolute) Latest Ref Range: 1.82 - 7.42 K/uL 4.20 3.49   Lymphocytes Latest Ref Range: 22.00 - 41.00 % 25.40 28.30   Lymphs (Absolute) Latest Ref Range: 1.00 - 4.80 K/uL 1.65 1.64   Monocytes Latest Ref Range: 0.00 - 13.40 % 8.30 8.80   Monos (Absolute) Latest Ref Range: 0.00 - 0.85 K/uL 0.54 0.51   Eosinophils  Latest Ref Range: 0.00 - 6.90 % 0.80 1.60   Eos (Absolute) Latest Ref Range: 0.00 - 0.51 K/uL 0.05 0.09   Basophils Latest Ref Range: 0.00 - 1.80 % 0.60 1.00   Baso (Absolute) Latest Ref Range: 0.00 - 0.12 K/uL 0.04 0.06   Immature Granulocytes Latest Ref Range: 0.00 - 0.90 % 0.30 0.20   Immature Granulocytes (abs) Latest Ref Range: 0.00 - 0.11 K/uL 0.02 0.01   Nucleated RBC Latest Units: /100 WBC 0.00 0.00   NRBC (Absolute) Latest Units: K/uL 0.00 0.00   Results for MEL MAIER (MRN [id number]) as of 10/11/2017 13:50   Ref. Range 1/21/2016 09:09 10/5/2017 07:40   Prostatic Specific Antigen Tot Latest Ref Range: 0.00 - 4.00 ng/mL 0.78 1.12     Assessment/Plan:     1. Essential hypertension  Controlled on his medications.  - LIPID PROFILE; Future  - COMP METABOLIC PANEL; Future    2. Dyslipidemia  All at target on atorvastatin.  - LIPID PROFILE; Future  - COMP METABOLIC PANEL; Future    3. Impaired fasting blood sugar  Fasting blood sugar is borderline elevated but nondiabetic level. He has increased respiratory diabetes so he will need to avoid sweets and decrease the carbohydrates, exercise regularly and lose weight.  - LIPID PROFILE; Future  - COMP METABOLIC PANEL; Future    4. Gout, arthritis  Uric acid level below 6.0. Continue allopurinol.  - LIPID PROFILE; Future  - COMP METABOLIC PANEL; Future    5. Obesity (BMI 30-39.9)  Discussed diet, exercise and weight loss.  - Patient identified as having weight management issue.  Appropriate orders and counseling given.      Please note that this dictation was created using voice recognition software. I have worked with consultants from the vendor as well as technical experts from [employer] to optimize the interface. I have made every reasonable attempt to correct obvious errors, but I expect that there are errors of grammar and possibly content I did not discover before finalizing the note.

## 2018-03-30 RX ORDER — POTASSIUM CHLORIDE 20 MEQ/1
TABLET, EXTENDED RELEASE ORAL
Qty: 90 TAB | Refills: 1 | Status: SHIPPED | OUTPATIENT
Start: 2018-03-30 | End: 2018-08-28 | Stop reason: SDUPTHER

## 2018-04-12 ENCOUNTER — SLEEP CENTER VISIT (OUTPATIENT)
Dept: SLEEP MEDICINE | Facility: MEDICAL CENTER | Age: 73
End: 2018-04-12
Payer: MEDICARE

## 2018-04-12 VITALS
OXYGEN SATURATION: 95 % | SYSTOLIC BLOOD PRESSURE: 142 MMHG | HEIGHT: 72 IN | WEIGHT: 227 LBS | HEART RATE: 75 BPM | BODY MASS INDEX: 30.75 KG/M2 | DIASTOLIC BLOOD PRESSURE: 74 MMHG | RESPIRATION RATE: 15 BRPM

## 2018-04-12 DIAGNOSIS — G47.33 OSA (OBSTRUCTIVE SLEEP APNEA): ICD-10-CM

## 2018-04-12 PROCEDURE — 99213 OFFICE O/P EST LOW 20 MIN: CPT | Performed by: NURSE PRACTITIONER

## 2018-04-12 NOTE — PROGRESS NOTES
Chief Complaint   Patient presents with   • Follow-Up     Annual    • Apnea         HPI: This patient is a 73 y.o. male, who presents for annual follow-up of obstructive sleep apnea.     PSG indicates severe obstructive sleep apnea with an RDI of 87.8, minimum oxygen saturation of 75 %. he is compliant with CPAP 10 cm H2O. Compliance download over the past 30 days indicates 100 % compliance, average use of 7 hours 25 minutes per night, AHI of 7.3. Mask and pressures are comfortable. Patient reports continuing to benefit from therapy. He reports some daytime fatigue but attributes this to stress. He denies a.m. headaches. It's been many years since he had a titration study. His pressures have been empirically increase from 6-10.    Past Medical History:   Diagnosis Date   • Allergic rhinitis    • Arrhythmia    • Arthritis     spine, thumbs   • BPH    • Bronchitis 1980's   • CATARACT     early cataracts both eyes   • chronic back pain    • Chronic neck pain    • COPD (chronic obstructive pulmonary disease) ([facility])     mild   • Dyslipidemia    • Gout    • Groin pain, left lower quadrant 7/18/2013   • HTN    • Hypertension    • Impaired fasting blood sugar    • OA (osteoarthritis) of knee     both knees   • KAYLIE (obstructive sleep apnea)     on CPAP   • Pneumonia 1988   • S/P carpal tunnel release     both wrists   • Snoring    • Torn  meniscus 7/07    s/p arthroscopic surgery R knee10/07   • Unspecified hemorrhagic conditions     bruises easy       Social History   Substance Use Topics   • Smoking status: Former Smoker     Packs/day: 2.00     Years: 25.00     Types: Cigarettes     Quit date: 5/23/1988   • Smokeless tobacco: Never Used      Comment: quit 1998   • Alcohol use 0.0 oz/week      Comment: 1-2 glasses/day, beer on weekends       Family History   Problem Relation Age of Onset   • Cancer Mother      Leukemia       Current medications as of today   Current Outpatient Prescriptions   Medication Sig Dispense  Refill   • potassium chloride SA (KDUR) 20 MEQ Tab CR TAKE 1 TABLET EVERY DAY 90 Tab 1   • metoprolol SR (TOPROL XL) 100 MG TABLET SR 24 HR TAKE 1 TABLET EVERY DAY 90 Tab 1   • fluticasone (FLONASE) 50 MCG/ACT nasal spray USE 2 SPRAYS IN EACH NOSTRIL EVERY DAY 48 g 3   • amlodipine (NORVASC) 10 MG Tab TAKE 1 TABLET EVERY DAY 90 Tab 3   • losartan-hydrochlorothiazide (HYZAAR) 100-25 MG per tablet TAKE 1 TABLET EVERY DAY 90 Tab 3   • allopurinol (ZYLOPRIM) 300 MG Tab TAKE 1 TABLET EVERY DAY 90 Tab 3   • atorvastatin (LIPITOR) 10 MG Tab TAKE 1 TABLET EVERY EVENING 90 Tab 3   • ciprofloxacin (CILOXIN) 0.3 % Solution      • prednisoLONE acetate (PRED FORTE) 1 % Suspension      • valsartan-hydrochlorothiazide (DIOVAN-HCT) 320-12.5 MG per tablet Take 1 Tab by mouth every day.     • metoprolol SR (TOPROL XL) 50 MG TABLET SR 24 HR Take 1 Tab by mouth every day. 90 Tab 3   • losartan-hydrochlorothiazide (HYZAAR) 100-12.5 MG per tablet Take 1 Tab by mouth every day. 90 Tab 3   • tramadol (ULTRAM) 50 MG TABS TAKE ONE TABLET BY MOUTH EVERY 4 HOURS AS NEEDED 30 Tab 0   • sildenafil citrate (VIAGRA) 100 MG tablet Take 1 Tab by mouth as needed for Erectile Dysfunction. 5 Tab 3   • Multiple Vitamins-Minerals (OCUVITE ADULT 50+ PO) Take  by mouth every day.       No current facility-administered medications for this visit.        Allergies: Nkda [no known drug allergy]    Blood pressure 142/74, pulse 75, resp. rate 15, height 1.829 m (6'), weight 103 kg (227 lb), SpO2 95 %.      ROS: As per HPI and otherwise negative if not stated.      Physical exam:   Constitutional: Well-nourished, well-developed, in no acute distress  Eyes: PERRL    Neck: supple, trachea midline  Respiratory: no intercostal retractions or accessory muscle use   Lungs auscultation: Clear to auscultation bilaterally  Cardiovascular: Regular rate rhythm no murmurs, rubs or gallops  Musculoskeletal: no clubbing or cyanosis  Skin: No rashes or lesions noted on exposed  skin  Neuro: No focal deficit noted  Psychiatric: Oriented to time, person and place.     Diagnosis:  1. BMI 30.0-30.9,adult     2. KAYLIE (obstructive sleep apnea)  POLYSOMNOGRAPHY TITRATION       Plan:  1. Given the length of time since his last study, titration study is recommended. His pressures have been empirically increased however AHI remains mildly elevated and patient reports symptoms of fatigue.  2. He declines prescription of Ambien  3. Follow-up after titration to review results

## 2018-04-16 DIAGNOSIS — G47.33 OSA (OBSTRUCTIVE SLEEP APNEA): ICD-10-CM

## 2018-04-16 NOTE — TELEPHONE ENCOUNTER
[facility] into the [facility]. He states he declined a sleep aide for his sleep study, but has since changed his mind. He was wondering if an RX could be send to the [facility]?

## 2018-04-17 RX ORDER — ZOLPIDEM TARTRATE 5 MG/1
TABLET ORAL
Qty: 3 TAB | Refills: 0 | Status: SHIPPED
Start: 2018-04-17 | End: 2018-05-17

## 2018-05-01 ENCOUNTER — SLEEP STUDY (OUTPATIENT)
Dept: SLEEP MEDICINE | Facility: MEDICAL CENTER | Age: 73
End: 2018-05-01
Attending: NURSE PRACTITIONER
Payer: MEDICARE

## 2018-05-01 DIAGNOSIS — G47.33 OSA (OBSTRUCTIVE SLEEP APNEA): ICD-10-CM

## 2018-05-01 PROCEDURE — 95811 POLYSOM 6/>YRS CPAP 4/> PARM: CPT | Performed by: INTERNAL MEDICINE

## 2018-05-02 NOTE — PROCEDURES
Comments:  The patient underwent a diagnostic polysomnogram using the standard montage for measurement of parameters of sleep, respiratory events, movement abnormalities, and heart rate and rhythm.    A microphone was used to monitor snoring.  Interpretation:  Study start time was 10:16:48 PM.  Diagnostic recording time was 7h 18.0m with a total sleep time of 5h 17.5m resulting in a sleep efficiency of 72.49%%.    Sleep latency from the start of the study was 16 minutes and the latency from sleep to REM was 92 minutes.  In total,57  arousals were scored for an arousal index of 10.8.  Respiratory:  There were a total of 15 apneas consisting of 1 obstructive apneas, 0 mixed apneas, and 14 central apneas.  A total of 9 hypopneas were scored.  The apnea index was 2.83 per hour and the hypopnea index was 1.70 per hour resulting in an overall AHI of 4.54.  AHI during rem was 4.1 and AHI while supine was 5.36.  Oximetry:  There was a mean oxygen saturation of 91.0% with a minimum oxygen saturation of 81.0%.  Time spent with oxygen saturations below 89% was 7.4 minutes.  Cardiac:  The highest heart rate seen while awake was 94 BPM while the highest heart rate during sleep was 85 BPM with an average sleeping heart rate of 71 BPM.  Limb Movements:  There were a total of 675 PLMs during sleep, of which 8 were PLMS arousals.  This resulted in a PLMS index of 127.6 and a PLMS arousal index of 1.5.    CPAP was tried from 7 to 12 cm H2O.  BiPAP was tried from 12/8 to 14/10 cm H2O.    The sleep efficiency on CPAP was 72%.  Sleep architecture showed preserved sleep stages per sleep latency was normal at 16 minutes.  There were elevated PLM's with index 125.  EKG showed sinus rhythm    CPAP therapy was titrated to 12 cm of water however the patient had difficulty tolerating pressures and bilevel therapy was initiated.  There was very minimal sleep on BiPAP therapy.  At CPAP: 12 cm of water the AHI was 0 and oximetry was maintained at  93% per    Interpretation:  Technically challenging CPAP titration as patient had difficulty tolerating pressures of 12 cm of water.  Periodic limb movement disorder, which subsided on higher CPAP pressures.    Recommendations:  Consider CPAP: 12 cm of water allowing acclamation to therapy, potentially with the temporary use of a sleep aid.  If the patient cannot tolerate these pressure settings, then a bilevel titration would be recommended in the sleep laboratory.  The patient used a large [employer]wear fullface mask.

## 2018-05-21 ENCOUNTER — HOSPITAL ENCOUNTER (OUTPATIENT)
Dept: LAB | Facility: MEDICAL CENTER | Age: 73
End: 2018-05-21
Attending: FAMILY MEDICINE
Payer: MEDICARE

## 2018-05-21 DIAGNOSIS — M10.9 GOUT, ARTHRITIS: ICD-10-CM

## 2018-05-21 DIAGNOSIS — I10 ESSENTIAL HYPERTENSION: ICD-10-CM

## 2018-05-21 DIAGNOSIS — R73.01 IMPAIRED FASTING BLOOD SUGAR: ICD-10-CM

## 2018-05-21 DIAGNOSIS — E78.5 DYSLIPIDEMIA: ICD-10-CM

## 2018-05-21 LAB
ALBUMIN SERPL BCP-MCNC: 4.3 G/DL (ref 3.2–4.9)
ALBUMIN/GLOB SERPL: 1.7 G/DL
ALP SERPL-CCNC: 56 U/L (ref 30–99)
ALT SERPL-CCNC: 46 U/L (ref 2–50)
ANION GAP SERPL CALC-SCNC: 11 MMOL/L (ref 0–11.9)
AST SERPL-CCNC: 25 U/L (ref 12–45)
BILIRUB SERPL-MCNC: 1.2 MG/DL (ref 0.1–1.5)
BUN SERPL-MCNC: 13 MG/DL (ref 8–22)
CALCIUM SERPL-MCNC: 9.8 MG/DL (ref 8.5–10.5)
CHLORIDE SERPL-SCNC: 105 MMOL/L (ref 96–112)
CHOLEST SERPL-MCNC: 136 MG/DL (ref 100–199)
CO2 SERPL-SCNC: 27 MMOL/L (ref 20–33)
CREAT SERPL-MCNC: 0.82 MG/DL (ref 0.5–1.4)
GLOBULIN SER CALC-MCNC: 2.6 G/DL (ref 1.9–3.5)
GLUCOSE SERPL-MCNC: 90 MG/DL (ref 65–99)
HDLC SERPL-MCNC: 55 MG/DL
LDLC SERPL CALC-MCNC: 66 MG/DL
POTASSIUM SERPL-SCNC: 4 MMOL/L (ref 3.6–5.5)
PROT SERPL-MCNC: 6.9 G/DL (ref 6–8.2)
SODIUM SERPL-SCNC: 143 MMOL/L (ref 135–145)
TRIGL SERPL-MCNC: 74 MG/DL (ref 0–149)

## 2018-05-21 PROCEDURE — 36415 COLL VENOUS BLD VENIPUNCTURE: CPT

## 2018-05-21 PROCEDURE — 80053 COMPREHEN METABOLIC PANEL: CPT

## 2018-05-21 PROCEDURE — 80061 LIPID PANEL: CPT

## 2018-05-24 ENCOUNTER — OFFICE VISIT (OUTPATIENT)
Dept: MEDICAL GROUP | Facility: PHYSICIAN GROUP | Age: 73
End: 2018-05-24
Payer: MEDICARE

## 2018-05-24 VITALS
WEIGHT: 218.26 LBS | OXYGEN SATURATION: 92 % | SYSTOLIC BLOOD PRESSURE: 126 MMHG | HEART RATE: 74 BPM | BODY MASS INDEX: 29.56 KG/M2 | DIASTOLIC BLOOD PRESSURE: 50 MMHG | TEMPERATURE: 98.1 F | HEIGHT: 72 IN

## 2018-05-24 DIAGNOSIS — Z23 NEED FOR SHINGLES VACCINE: ICD-10-CM

## 2018-05-24 DIAGNOSIS — I10 ESSENTIAL HYPERTENSION: ICD-10-CM

## 2018-05-24 DIAGNOSIS — Z12.5 SCREENING FOR PROSTATE CANCER: ICD-10-CM

## 2018-05-24 DIAGNOSIS — M10.9 GOUT, ARTHRITIS: ICD-10-CM

## 2018-05-24 DIAGNOSIS — Z23 NEED FOR DIPHTHERIA-TETANUS-PERTUSSIS (TDAP) VACCINE: ICD-10-CM

## 2018-05-24 DIAGNOSIS — E78.5 DYSLIPIDEMIA: ICD-10-CM

## 2018-05-24 DIAGNOSIS — R73.01 IMPAIRED FASTING BLOOD SUGAR: ICD-10-CM

## 2018-05-24 PROCEDURE — 99214 OFFICE O/P EST MOD 30 MIN: CPT | Performed by: FAMILY MEDICINE

## 2018-05-24 ASSESSMENT — PATIENT HEALTH QUESTIONNAIRE - PHQ9: CLINICAL INTERPRETATION OF PHQ2 SCORE: 0

## 2018-05-26 NOTE — PROGRESS NOTES
Subjective:      Mel Maier is a 73 y.o. male who presents with Hypertension            HPI     Patient returns for follow-up of his medical problems.    In terms of his hypertension, this is well controlled on metoprolol, amlodipine, losartan/HCTZ. He said he has been more careful in terms of alcohol consumption, he has been watching the diet better, more active and he has lost 4 pounds since her last visit.    For the dyslipidemia, he continues to take atorvastatin without myalgias.    We continue to follow him for impaired fasting blood sugar and his diet.    In terms of his gout, he has not had any attack in a long time. He continues to take allopurinol.    Past medical history, past surgical history, family history reviewed-no changes    Social history reviewed-no changes    Allergies reviewed-no changes    Medications reviewed-no changes    ROS     As per history of present illness, the rest are negative.       Objective:     /50   Pulse 74   Temp 36.7 °C (98.1 °F)   Ht 1.829 m (6')   Wt 99 kg (218 lb 4.1 oz)   SpO2 92%   BMI 29.60 kg/m²      Physical Exam     Examined alert, awake, oriented, not in distress    Neck-supple, no lymphadenopathy, no thyromegaly  Lungs-clear to auscultation, no rales, no wheezes  Heart-regular rate and rhythm, no murmur  Extremities-no edema, clubbing, cyanosis       Results for MEL MAIER (MRN [id number]) as of 5/25/2018 19:14   Ref. Range 10/5/2017 07:40 5/21/2018 11:25   Sodium Latest Ref Range: 135 - 145 mmol/L 143 143   Potassium Latest Ref Range: 3.6 - 5.5 mmol/L 4.1 4.0   Chloride Latest Ref Range: 96 - 112 mmol/L 107 105   Co2 Latest Ref Range: 20 - 33 mmol/L 28 27   Anion Gap Latest Ref Range: 0.0 - 11.9  8.0 11.0   Glucose Latest Ref Range: 65 - 99 mg/dL 111 (H) 90   Bun Latest Ref Range: 8 - 22 mg/dL 13 13   Creatinine Latest Ref Range: 0.50 - 1.40 mg/dL 0.80 0.82   GFR If  Latest Ref Range: >60 mL/min/1.73 m 2 >60 >60   GFR  If Non  Latest Ref Range: >60 mL/min/1.73 m 2 >60 >60   Calcium Latest Ref Range: 8.5 - 10.5 mg/dL 9.5 9.8   AST(SGOT) Latest Ref Range: 12 - 45 U/L 21 25   ALT(SGPT) Latest Ref Range: 2 - 50 U/L 31 46   Alkaline Phosphatase Latest Ref Range: 30 - 99 U/L 56 56   Total Bilirubin Latest Ref Range: 0.1 - 1.5 mg/dL 0.7 1.2   Albumin Latest Ref Range: 3.2 - 4.9 g/dL 4.4 4.3   Total Protein Latest Ref Range: 6.0 - 8.2 g/dL 7.2 6.9   Globulin Latest Ref Range: 1.9 - 3.5 g/dL 2.8 2.6   A-G Ratio Latest Units: g/dL 1.6 1.7   Uric Acid Latest Ref Range: 2.5 - 8.3 mg/dL 4.7    Glycohemoglobin Latest Ref Range: 0.0 - 5.6 % 6.0 (H)    Estim. Avg Glu Latest Units: mg/dL 126    Cholesterol,Tot Latest Ref Range: 100 - 199 mg/dL 124 136   Triglycerides Latest Ref Range: 0 - 149 mg/dL 84 74   HDL Latest Ref Range: >=40 mg/dL 42 55   LDL Latest Ref Range: <100 mg/dL 65 66        Assessment/Plan:     1. Essential hypertension  His blood pressure has been running well controlled on his meds. He has done well in terms of watching the alcohol, watching his diet, being more active and losing weight. He is encouraged to continue with his efforts. Recheck blood work next visit.  - LIPID PROFILE; Future  - COMP METABOLIC PANEL; Future  - HEMOGLOBIN A1C; Future  - CBC WITH DIFFERENTIAL; Future  - VITAMIN D,25 HYDROXY; Future  - PROSTATE SPECIFIC AG SCREENING; Future    2. Dyslipidemia  Cholesterol panel came back all at target on atorvastatin.  - LIPID PROFILE; Future  - COMP METABOLIC PANEL; Future  - HEMOGLOBIN A1C; Future  - CBC WITH DIFFERENTIAL; Future  - VITAMIN D,25 HYDROXY; Future  - PROSTATE SPECIFIC AG SCREENING; Future    3. Impaired fasting blood sugar  Fasting blood sugar came back normal this time. He will continue to work with his diet, exercise and weight loss to keep this normal. Recheck blood work next visit.  - LIPID PROFILE; Future  - COMP METABOLIC PANEL; Future  - HEMOGLOBIN A1C; Future  - CBC WITH  DIFFERENTIAL; Future  - VITAMIN D,25 HYDROXY; Future  - PROSTATE SPECIFIC AG SCREENING; Future    4. Gout, arthritis  Continue allopurinol. Last uric acid level was less than 6.0. He has not had any attack in a long time.    5. Need for shingles vaccine  Discussed new shingles vaccine. He is agreeable to proceed and he was given prescription today from local drugstore. He will get a second dose in 2-6 months time.  - Zoster Vac Recomb Adjuvanted (SHINGRIX) 50 MCG Recon Susp; 0.5 mL by Intramuscular route Once for 1 dose.  Dispense: 0.5 mL; Refill: 0    6. Need for diphtheria-tetanus-pertussis (Tdap) vaccine  He also needs tdap. He was given prescription to get this from a local drugstore.  - tetanus-dipth-acell pertussis (ADACEL) 5-2-15.5 LF-MCG/0.5 Suspension; 0.5 mL by Intramuscular route Once PRN for up to 1 dose.  Dispense: 0.5 mL; Refill: 0    7. Screening for prostate cancer  Screening PSA ordered for the next blood work.  - LIPID PROFILE; Future  - COMP METABOLIC PANEL; Future  - HEMOGLOBIN A1C; Future  - CBC WITH DIFFERENTIAL; Future  - VITAMIN D,25 HYDROXY; Future  - PROSTATE SPECIFIC AG SCREENING; Future      Please note that this dictation was created using voice recognition software. I have worked with consultants from the vendor as well as technical experts from [facility]  [employer] to optimize the interface. I have made every reasonable attempt to correct obvious errors, but I expect that there are errors of grammar and possibly content I did not discover before finalizing the note.

## 2018-06-15 ENCOUNTER — SLEEP CENTER VISIT (OUTPATIENT)
Dept: SLEEP MEDICINE | Facility: MEDICAL CENTER | Age: 73
End: 2018-06-15
Payer: MEDICARE

## 2018-06-15 VITALS
HEIGHT: 72 IN | SYSTOLIC BLOOD PRESSURE: 144 MMHG | DIASTOLIC BLOOD PRESSURE: 85 MMHG | OXYGEN SATURATION: 98 % | RESPIRATION RATE: 15 BRPM | WEIGHT: 218 LBS | HEART RATE: 66 BPM | BODY MASS INDEX: 29.53 KG/M2

## 2018-06-15 DIAGNOSIS — I10 ESSENTIAL HYPERTENSION: ICD-10-CM

## 2018-06-15 DIAGNOSIS — G47.33 OSA (OBSTRUCTIVE SLEEP APNEA): ICD-10-CM

## 2018-06-15 PROCEDURE — 99213 OFFICE O/P EST LOW 20 MIN: CPT | Performed by: NURSE PRACTITIONER

## 2018-06-15 NOTE — PROGRESS NOTES
Chief Complaint   Patient presents with   • Results     SS         HPI: This patient is a 73 y.o. male, who presents for sleep study results.     PSG indicates severe obstructive sleep apnea with an RDI of 87.8, minimum oxygen saturation of 75 %. he is compliant with CPAP 10 cm H2O.  His pressures have been empirically increased over the years from 6-10 cm H2O.  He reported some EDS at his last visit.  Titration study was recommended. He did best on CPAP 12 cm H2O.    Past Medical History:   Diagnosis Date   • Allergic rhinitis    • Arrhythmia    • Arthritis     spine, thumbs   • BPH    • Bronchitis 1980's   • CATARACT     early cataracts both eyes   • chronic back pain    • Chronic neck pain    • COPD (chronic obstructive pulmonary disease) (HCC)     mild   • Dyslipidemia    • Gout    • Groin pain, left lower quadrant 7/18/2013   • HTN    • Hypertension    • Impaired fasting blood sugar    • OA (osteoarthritis) of knee     both knees   • KAYLIE (obstructive sleep apnea)     on CPAP   • Pneumonia 1988   • S/P carpal tunnel release     both wrists   • Snoring    • Torn  meniscus 7/07    s/p arthroscopic surgery R knee10/07   • Unspecified hemorrhagic conditions     bruises easy       Social History   Substance Use Topics   • Smoking status: Former Smoker     Packs/day: 2.00     Years: 25.00     Types: Cigarettes     Quit date: 5/23/1988   • Smokeless tobacco: Never Used      Comment: quit 1998   • Alcohol use 0.0 oz/week      Comment: 1-2 glasses/day, beer on weekends       Family History   Problem Relation Age of Onset   • Cancer Mother      Leukemia   • Sleep Apnea Neg Hx        Current medications as of today   Current Outpatient Prescriptions   Medication Sig Dispense Refill   • tetanus-dipth-acell pertussis (ADACEL) 5-2-15.5 LF-MCG/0.5 Suspension 0.5 mL by Intramuscular route Once PRN for up to 1 dose. 0.5 mL 0   • potassium chloride SA (KDUR) 20 MEQ Tab CR TAKE 1 TABLET EVERY DAY 90 Tab 1   • metoprolol SR (TOPROL  XL) 100 MG TABLET SR 24 HR TAKE 1 TABLET EVERY DAY 90 Tab 1   • fluticasone (FLONASE) 50 MCG/ACT nasal spray USE 2 SPRAYS IN EACH NOSTRIL EVERY DAY 48 g 3   • amlodipine (NORVASC) 10 MG Tab TAKE 1 TABLET EVERY DAY 90 Tab 3   • losartan-hydrochlorothiazide (HYZAAR) 100-25 MG per tablet TAKE 1 TABLET EVERY DAY 90 Tab 3   • allopurinol (ZYLOPRIM) 300 MG Tab TAKE 1 TABLET EVERY DAY 90 Tab 3   • atorvastatin (LIPITOR) 10 MG Tab TAKE 1 TABLET EVERY EVENING 90 Tab 3   • tramadol (ULTRAM) 50 MG TABS TAKE ONE TABLET BY MOUTH EVERY 4 HOURS AS NEEDED 30 Tab 0   • Multiple Vitamins-Minerals (OCUVITE ADULT 50+ PO) Take  by mouth every day.       No current facility-administered medications for this visit.        Allergies: Nkda [no known drug allergy]    Blood pressure 144/85, pulse 66, resp. rate 15, height 1.829 m (6'), weight 98.9 kg (218 lb), SpO2 98 %.      ROS: As per HPI and otherwise negative if not stated.      Physical exam:   Constitutional: Well-nourished, well-developed, in no acute distress  Eyes: PERRL  Neck: supple, trachea midline  Respiratory: no intercostal retractions or accessory muscle use   Lungs auscultation: Clear to auscultation bilaterally  Cardiovascular: Regular rate rhythm no murmurs, rubs or gallops  Musculoskeletal: no clubbing or cyanosis  Skin: No rashes or lesions noted on exposed skin  Neuro: No focal deficit noted  Psychiatric: Oriented to time, person and place.     Diagnosis:  1. KAYLIE (obstructive sleep apnea)  DME CPAP   2. Essential hypertension     3. BMI 29.0-29.9,adult         Plan:  Sleep testing was reviewed in detail with the patient.    1.  Patient CPAP is outdated, will order new CPAP through [facility] with new mask and supplies  2.  He will follow-up in 3 months for compliance download, sooner if needed

## 2018-06-18 DIAGNOSIS — I10 ESSENTIAL HYPERTENSION: ICD-10-CM

## 2018-06-18 RX ORDER — ATORVASTATIN CALCIUM 10 MG/1
TABLET, FILM COATED ORAL
Qty: 90 TAB | Refills: 3 | Status: SHIPPED | OUTPATIENT
Start: 2018-06-18 | End: 2018-06-25 | Stop reason: SDUPTHER

## 2018-06-18 RX ORDER — METOPROLOL SUCCINATE 100 MG/1
TABLET, EXTENDED RELEASE ORAL
Qty: 90 TAB | Refills: 3 | Status: SHIPPED | OUTPATIENT
Start: 2018-06-18 | End: 2018-06-25 | Stop reason: SDUPTHER

## 2018-06-25 DIAGNOSIS — I10 ESSENTIAL HYPERTENSION: ICD-10-CM

## 2018-06-25 RX ORDER — ATORVASTATIN CALCIUM 10 MG/1
TABLET, FILM COATED ORAL
Qty: 90 TAB | Refills: 3 | Status: SHIPPED | OUTPATIENT
Start: 2018-06-25 | End: 2019-06-15 | Stop reason: SDUPTHER

## 2018-06-25 RX ORDER — METOPROLOL SUCCINATE 100 MG/1
TABLET, EXTENDED RELEASE ORAL
Qty: 90 TAB | Refills: 3 | Status: SHIPPED | OUTPATIENT
Start: 2018-06-25 | End: 2019-06-15 | Stop reason: SDUPTHER

## 2018-08-28 RX ORDER — LOSARTAN POTASSIUM AND HYDROCHLOROTHIAZIDE 25; 100 MG/1; MG/1
TABLET ORAL
Qty: 90 TAB | Refills: 3 | Status: SHIPPED | OUTPATIENT
Start: 2018-08-28

## 2018-08-28 RX ORDER — AMLODIPINE BESYLATE 10 MG/1
TABLET ORAL
Qty: 90 TAB | Refills: 3 | Status: SHIPPED | OUTPATIENT
Start: 2018-08-28

## 2018-08-28 RX ORDER — POTASSIUM CHLORIDE 1500 MG/1
TABLET, EXTENDED RELEASE ORAL
Qty: 90 TAB | Refills: 3 | Status: SHIPPED | OUTPATIENT
Start: 2018-08-28

## 2018-08-28 RX ORDER — ALLOPURINOL 300 MG/1
TABLET ORAL
Qty: 90 TAB | Refills: 3 | Status: SHIPPED | OUTPATIENT
Start: 2018-08-28

## 2018-08-28 RX ORDER — FLUTICASONE PROPIONATE 50 MCG
SPRAY, SUSPENSION (ML) NASAL
Qty: 1 BOTTLE | Refills: 5 | Status: ON HOLD | OUTPATIENT
Start: 2018-08-28 | End: 2019-01-28 | Stop reason: CLARIF

## 2018-09-04 ENCOUNTER — APPOINTMENT (RX ONLY)
Dept: URBAN - METROPOLITAN AREA CLINIC 4 | Facility: CLINIC | Age: 73
Setting detail: DERMATOLOGY
End: 2018-09-04

## 2018-09-04 DIAGNOSIS — L81.4 OTHER MELANIN HYPERPIGMENTATION: ICD-10-CM

## 2018-09-04 DIAGNOSIS — D18.0 HEMANGIOMA: ICD-10-CM

## 2018-09-04 DIAGNOSIS — D36.1 BENIGN NEOPLASM OF PERIPHERAL NERVES AND AUTONOMIC NERVOUS SYSTEM: ICD-10-CM

## 2018-09-04 DIAGNOSIS — L82.1 OTHER SEBORRHEIC KERATOSIS: ICD-10-CM

## 2018-09-04 PROBLEM — I10 ESSENTIAL (PRIMARY) HYPERTENSION: Status: ACTIVE | Noted: 2018-09-04

## 2018-09-04 PROBLEM — D48.5 NEOPLASM OF UNCERTAIN BEHAVIOR OF SKIN: Status: ACTIVE | Noted: 2018-09-04

## 2018-09-04 PROBLEM — D18.01 HEMANGIOMA OF SKIN AND SUBCUTANEOUS TISSUE: Status: ACTIVE | Noted: 2018-09-04

## 2018-09-04 PROCEDURE — 11100: CPT

## 2018-09-04 PROCEDURE — ? BIOPSY BY SHAVE METHOD

## 2018-09-04 PROCEDURE — ? LIQUID NITROGEN

## 2018-09-04 PROCEDURE — 99213 OFFICE O/P EST LOW 20 MIN: CPT | Mod: 25

## 2018-09-04 ASSESSMENT — LOCATION ZONE DERM
LOCATION ZONE: ARM
LOCATION ZONE: TRUNK

## 2018-09-04 ASSESSMENT — LOCATION SIMPLE DESCRIPTION DERM
LOCATION SIMPLE: RIGHT UPPER BACK
LOCATION SIMPLE: RIGHT FOREARM
LOCATION SIMPLE: ABDOMEN
LOCATION SIMPLE: UPPER BACK

## 2018-09-04 ASSESSMENT — LOCATION DETAILED DESCRIPTION DERM
LOCATION DETAILED: RIGHT VENTRAL PROXIMAL FOREARM
LOCATION DETAILED: INFERIOR THORACIC SPINE
LOCATION DETAILED: RIGHT MEDIAL UPPER BACK
LOCATION DETAILED: PERIUMBILICAL SKIN

## 2018-09-04 NOTE — PROCEDURE: LIQUID NITROGEN
Include Z78.9 (Other Specified Conditions Influencing Health Status) As An Associated Diagnosis?: No
Detail Level: Detailed
Duration Of Freeze Thaw-Cycle (Seconds): 0
Consent: The patient's consent was obtained including but not limited to risks of crusting, scabbing, blistering, scarring, darker or lighter pigmentary change, recurrence, incomplete removal and infection.
Post-Care Instructions: I reviewed with the patient in detail post-care instructions. Patient is to wear sunprotection, and avoid picking at any of the treated lesions. Pt may apply Vaseline to crusted or scabbing areas.
Medical Necessity Clause: This procedure was medically necessary because the lesions that were treated were:
Medical Necessity Information: It is in your best interest to select a reason for this procedure from the list below. All of these items fulfill various CMS LCD requirements except the new and changing color options.

## 2018-09-04 NOTE — PROCEDURE: BIOPSY BY SHAVE METHOD
Billing Type: Third-Party Bill
Lab Facility: 
X Size Of Lesion In Cm: 0
Notification Instructions: Patient will be notified of biopsy results. However, patient instructed to call the office if not contacted within 2 weeks.
Post-Care Instructions: I reviewed with the patient in detail post-care instructions. Patient is to keep the biopsy site dry overnight, and then apply vasaline twice daily until healed.
Detail Level: Detailed
Anesthesia Type: 1% lidocaine with epinephrine and a 1:10 solution of 8.4% sodium bicarbonate
Type Of Destruction Used: Curettage
Biopsy Type: H and E
Anesthesia Volume In Cc: 0.5
Wound Care: Vaseline
Depth Of Biopsy: dermis
Electrodesiccation Text: The wound bed was treated with electrodesiccation after the biopsy was performed.
Curettage Text: The wound bed was treated with curettage after the biopsy was performed.
Biopsy Method: Personna blade
Bill For Surgical Tray: no
Lab: [id number]
Electrodesiccation And Curettage Text: The wound bed was treated with electrodesiccation and curettage after the biopsy was performed.
Hemostasis: Drysol and Electrocautery
Dressing: Band-Aid
Consent: Written consent was obtained and risks were reviewed including but not limited to scarring, infection, bleeding, scabbing, incomplete removal, nerve damage and allergy to anesthesia.
Was A Bandage Applied: Yes

## 2018-09-27 ENCOUNTER — SLEEP CENTER VISIT (OUTPATIENT)
Dept: SLEEP MEDICINE | Facility: MEDICAL CENTER | Age: 73
End: 2018-09-27
Payer: MEDICARE

## 2018-09-27 VITALS
HEIGHT: 72 IN | TEMPERATURE: 98.6 F | WEIGHT: 223 LBS | DIASTOLIC BLOOD PRESSURE: 66 MMHG | OXYGEN SATURATION: 77 % | SYSTOLIC BLOOD PRESSURE: 130 MMHG | BODY MASS INDEX: 30.2 KG/M2 | HEART RATE: 77 BPM | RESPIRATION RATE: 16 BRPM

## 2018-09-27 DIAGNOSIS — G47.33 OSA (OBSTRUCTIVE SLEEP APNEA): ICD-10-CM

## 2018-09-27 PROCEDURE — 99213 OFFICE O/P EST LOW 20 MIN: CPT | Performed by: NURSE PRACTITIONER

## 2018-09-27 RX ORDER — MELOXICAM 15 MG/1
15 TABLET ORAL
COMMUNITY
Start: 2018-09-12 | End: 2019-07-11

## 2018-09-27 ASSESSMENT — ENCOUNTER SYMPTOMS
EYE DISCHARGE: 0
NECK PAIN: 1
CONSTITUTIONAL NEGATIVE: 1
RESPIRATORY NEGATIVE: 1
EYE PAIN: 0
CARDIOVASCULAR NEGATIVE: 1
BRUISES/BLEEDS EASILY: 0

## 2018-09-27 NOTE — PROGRESS NOTES
Chief Complaint   Patient presents with   • Apnea     last seen 6/15/18         HPI: This patient is a 73 y.o. male, who presents for 3-month follow-up of KAYLIE.     PSG indicates severe obstructive sleep apnea with an RDI of 87.8, minimum oxygen saturation of 75 %. he is compliant with CPAP 12 cm H2O. Compliance download over the past 30 days indicates 100 % compliance, average use of 7 hours 5 minutes per night, AHI of 8.8.  No significant air leak.  Patient reports benefiting from therapy.  Denies EDS or a.m. headache.  Denies changes to his health since he was last seen.  He is planning a trip to [place] for the next 2 months.      Past Medical History:   Diagnosis Date   • Allergic rhinitis    • Arrhythmia    • Arthritis     spine, thumbs   • BPH    • Bronchitis 1980's   • CATARACT     early cataracts both eyes   • chronic back pain    • Chronic neck pain    • COPD (chronic obstructive pulmonary disease) (HCC)     mild   • Dyslipidemia    • Gout    • Groin pain, left lower quadrant 7/18/2013   • HTN    • Hypertension    • Impaired fasting blood sugar    • OA (osteoarthritis) of knee     both knees   • KAYLIE (obstructive sleep apnea)     on CPAP   • Pneumonia 1988   • S/P carpal tunnel release     both wrists   • Snoring    • Torn  meniscus 7/07    s/p arthroscopic surgery R knee10/07   • Unspecified hemorrhagic conditions     bruises easy       Social History   Substance Use Topics   • Smoking status: Former Smoker     Packs/day: 2.00     Years: 25.00     Types: Cigarettes     Quit date: 5/23/1988   • Smokeless tobacco: Never Used      Comment: quit 1998   • Alcohol use 0.0 oz/week      Comment: 1-2 glasses/day, beer on weekends       Family History   Problem Relation Age of Onset   • Cancer Mother         Leukemia   • Sleep Apnea Neg Hx        Immunization History   Administered Date(s) Administered   • Hep A/HEP B Combined Vaccine (TwinRix) 12/20/2004, 06/14/2005   • Hepatitis A Vaccine, Adult 12/20/2004   •  Hepatitis B Vaccine Non-Recombivax (Ped/Adol) 12/20/2004, 01/26/2005, 06/14/2005   • Influenza Seasonal Injectable 10/25/2011, 10/18/2012, 10/31/2013, 10/04/2014   • Influenza TIV (IM) 10/18/2012, 10/04/2014, 10/04/2016   • Influenza Vaccine 11/15/2011   • Influenza Vaccine Adult HD 10/09/2015, 10/04/2016, 09/30/2017, 09/26/2018   • Pneumococcal Conjugate Vaccine (Prevnar/PCV-13) 05/05/2016   • Pneumococcal Vaccine (UF)Historical Data 12/20/2004   • Pneumococcal polysaccharide vaccine (PPSV-23) 12/15/2011   • TD Vaccine 12/20/2004, 07/11/2011   • Tetanus Vaccine 07/11/2011   • Zoster Vaccine Live (ZVL) (Zostavax) 01/21/2013       Current medications as of today   Current Outpatient Prescriptions   Medication Sig Dispense Refill   • meloxicam (MOBIC) 15 MG tablet Take 15 mg by mouth every day.     • Multiple Vitamins-Minerals (EQ VISION FORMULA 50+ PO) Take  by mouth.     • FIBER PO Take  by mouth.     • fluticasone (FLONASE) 50 MCG/ACT nasal spray USE 2 SPRAYS IN EACH NOSTRIL EVERY DAY 1 Bottle 5   • allopurinol (ZYLOPRIM) 300 MG Tab TAKE 1 TABLET EVERY DAY 90 Tab 3   • losartan-hydrochlorothiazide (HYZAAR) 100-25 MG per tablet TAKE 1 TABLET EVERY DAY 90 Tab 3   • amLODIPine (NORVASC) 10 MG Tab TAKE 1 TABLET EVERY DAY 90 Tab 3   • KLOR-CON M20 20 MEQ Tab CR TAKE 1 TABLET EVERY DAY 90 Tab 3   • metoprolol SR (TOPROL XL) 100 MG TABLET SR 24 HR TAKE 1 TABLET EVERY DAY 90 Tab 3   • atorvastatin (LIPITOR) 10 MG Tab TAKE 1 TABLET EVERY EVENING 90 Tab 3   • tramadol (ULTRAM) 50 MG TABS TAKE ONE TABLET BY MOUTH EVERY 4 HOURS AS NEEDED 30 Tab 0   • Multiple Vitamins-Minerals (OCUVITE ADULT 50+ PO) Take  by mouth every day.     • tetanus-dipth-acell pertussis (ADACEL) 5-2-15.5 LF-MCG/0.5 Suspension 0.5 mL by Intramuscular route Once PRN for up to 1 dose. 0.5 mL 0     No current facility-administered medications for this visit.        Allergies: Nkda [no known drug allergy]    Blood pressure 130/66, pulse 77, temperature 37 °C  (98.6 °F), temperature source Temporal, resp. rate 16, height 1.829 m (6'), weight 101.2 kg (223 lb), SpO2 (!) 77 %.      Review of Systems   Constitutional: Negative.    HENT: Negative.    Eyes: Negative for pain and discharge.   Respiratory: Negative.    Cardiovascular: Negative.    Musculoskeletal: Positive for neck pain.   Skin: Negative.    Endo/Heme/Allergies: Negative for environmental allergies. Does not bruise/bleed easily.       Physical Exam   Constitutional: He is oriented to person, place, and time and well-developed, well-nourished, and in no distress.   HENT:   Head: Normocephalic and atraumatic.   Eyes: Pupils are equal, round, and reactive to light.   Neck: Normal range of motion. Neck supple. No tracheal deviation present.   Pulmonary/Chest: Effort normal.   Neurological: He is alert and oriented to person, place, and time.   Skin: Skin is warm and dry.   Psychiatric: Mood, memory, affect and judgment normal.   Vitals reviewed.      Diagnoses/Plan:    1. KAYLIE (obstructive sleep apnea)   Continue CPAP nightly, Clean mask & tubing weekly, Replace supplies as insurance will allow, RX for new supplies to DME      - DME Mask and Supplies    Follow-up annually, sooner if needed      This dictation was created using voice recognition software. The accuracy of the dictation is limited to the abilities of the software. I expect there may be some errors of grammar and possibly content.

## 2018-10-19 ENCOUNTER — HOSPITAL ENCOUNTER (OUTPATIENT)
Dept: LAB | Facility: MEDICAL CENTER | Age: 73
End: 2018-10-19
Attending: FAMILY MEDICINE
Payer: MEDICARE

## 2018-10-19 DIAGNOSIS — Z12.5 SCREENING FOR PROSTATE CANCER: ICD-10-CM

## 2018-10-19 DIAGNOSIS — I10 ESSENTIAL HYPERTENSION: ICD-10-CM

## 2018-10-19 DIAGNOSIS — E78.5 DYSLIPIDEMIA: ICD-10-CM

## 2018-10-19 DIAGNOSIS — R73.01 IMPAIRED FASTING BLOOD SUGAR: ICD-10-CM

## 2018-10-19 LAB
25(OH)D3 SERPL-MCNC: 24 NG/ML (ref 30–100)
ALBUMIN SERPL BCP-MCNC: 4.4 G/DL (ref 3.2–4.9)
ALBUMIN/GLOB SERPL: 1.7 G/DL
ALP SERPL-CCNC: 51 U/L (ref 30–99)
ALT SERPL-CCNC: 38 U/L (ref 2–50)
ANION GAP SERPL CALC-SCNC: 7 MMOL/L (ref 0–11.9)
AST SERPL-CCNC: 24 U/L (ref 12–45)
BASOPHILS # BLD AUTO: 0.8 % (ref 0–1.8)
BASOPHILS # BLD: 0.04 K/UL (ref 0–0.12)
BILIRUB SERPL-MCNC: 0.8 MG/DL (ref 0.1–1.5)
BUN SERPL-MCNC: 14 MG/DL (ref 8–22)
CALCIUM SERPL-MCNC: 9.8 MG/DL (ref 8.5–10.5)
CHLORIDE SERPL-SCNC: 107 MMOL/L (ref 96–112)
CHOLEST SERPL-MCNC: 149 MG/DL (ref 100–199)
CO2 SERPL-SCNC: 29 MMOL/L (ref 20–33)
CREAT SERPL-MCNC: 0.99 MG/DL (ref 0.5–1.4)
EOSINOPHIL # BLD AUTO: 0.06 K/UL (ref 0–0.51)
EOSINOPHIL NFR BLD: 1.2 % (ref 0–6.9)
ERYTHROCYTE [DISTWIDTH] IN BLOOD BY AUTOMATED COUNT: 45.5 FL (ref 35.9–50)
EST. AVERAGE GLUCOSE BLD GHB EST-MCNC: 126 MG/DL
FASTING STATUS PATIENT QL REPORTED: NORMAL
GLOBULIN SER CALC-MCNC: 2.6 G/DL (ref 1.9–3.5)
GLUCOSE SERPL-MCNC: 110 MG/DL (ref 65–99)
HBA1C MFR BLD: 6 % (ref 0–5.6)
HCT VFR BLD AUTO: 44.5 % (ref 42–52)
HDLC SERPL-MCNC: 51 MG/DL
HGB BLD-MCNC: 14.3 G/DL (ref 14–18)
IMM GRANULOCYTES # BLD AUTO: 0.01 K/UL (ref 0–0.11)
IMM GRANULOCYTES NFR BLD AUTO: 0.2 % (ref 0–0.9)
LDLC SERPL CALC-MCNC: 80 MG/DL
LYMPHOCYTES # BLD AUTO: 1.54 K/UL (ref 1–4.8)
LYMPHOCYTES NFR BLD: 31.8 % (ref 22–41)
MCH RBC QN AUTO: 30.3 PG (ref 27–33)
MCHC RBC AUTO-ENTMCNC: 32.1 G/DL (ref 33.7–35.3)
MCV RBC AUTO: 94.3 FL (ref 81.4–97.8)
MONOCYTES # BLD AUTO: 0.55 K/UL (ref 0–0.85)
MONOCYTES NFR BLD AUTO: 11.4 % (ref 0–13.4)
NEUTROPHILS # BLD AUTO: 2.64 K/UL (ref 1.82–7.42)
NEUTROPHILS NFR BLD: 54.6 % (ref 44–72)
NRBC # BLD AUTO: 0 K/UL
NRBC BLD-RTO: 0 /100 WBC
PLATELET # BLD AUTO: 238 K/UL (ref 164–446)
PMV BLD AUTO: 10.1 FL (ref 9–12.9)
POTASSIUM SERPL-SCNC: 3.8 MMOL/L (ref 3.6–5.5)
PROT SERPL-MCNC: 7 G/DL (ref 6–8.2)
PSA SERPL-MCNC: 1.47 NG/ML (ref 0–4)
RBC # BLD AUTO: 4.72 M/UL (ref 4.7–6.1)
SODIUM SERPL-SCNC: 143 MMOL/L (ref 135–145)
TRIGL SERPL-MCNC: 88 MG/DL (ref 0–149)
WBC # BLD AUTO: 4.8 K/UL (ref 4.8–10.8)

## 2018-10-19 PROCEDURE — 82306 VITAMIN D 25 HYDROXY: CPT

## 2018-10-19 PROCEDURE — 84153 ASSAY OF PSA TOTAL: CPT | Mod: GA

## 2018-10-19 PROCEDURE — 80053 COMPREHEN METABOLIC PANEL: CPT

## 2018-10-19 PROCEDURE — 80061 LIPID PANEL: CPT

## 2018-10-19 PROCEDURE — 83036 HEMOGLOBIN GLYCOSYLATED A1C: CPT | Mod: GA

## 2018-10-19 PROCEDURE — 36415 COLL VENOUS BLD VENIPUNCTURE: CPT

## 2018-10-19 PROCEDURE — 85025 COMPLETE CBC W/AUTO DIFF WBC: CPT

## 2018-10-24 ENCOUNTER — OFFICE VISIT (OUTPATIENT)
Dept: MEDICAL GROUP | Facility: PHYSICIAN GROUP | Age: 73
End: 2018-10-24
Payer: MEDICARE

## 2018-10-24 VITALS
DIASTOLIC BLOOD PRESSURE: 70 MMHG | TEMPERATURE: 97.6 F | SYSTOLIC BLOOD PRESSURE: 140 MMHG | HEIGHT: 72 IN | HEART RATE: 71 BPM | WEIGHT: 226.19 LBS | OXYGEN SATURATION: 94 % | BODY MASS INDEX: 30.64 KG/M2

## 2018-10-24 DIAGNOSIS — R73.01 IMPAIRED FASTING BLOOD SUGAR: ICD-10-CM

## 2018-10-24 DIAGNOSIS — Z23 NEED FOR DIPHTHERIA-TETANUS-PERTUSSIS (TDAP) VACCINE: ICD-10-CM

## 2018-10-24 DIAGNOSIS — I10 ESSENTIAL HYPERTENSION: ICD-10-CM

## 2018-10-24 DIAGNOSIS — E55.9 VITAMIN D DEFICIENCY: ICD-10-CM

## 2018-10-24 DIAGNOSIS — E78.5 DYSLIPIDEMIA: ICD-10-CM

## 2018-10-24 DIAGNOSIS — M10.9 GOUT, ARTHRITIS: ICD-10-CM

## 2018-10-24 PROCEDURE — 99214 OFFICE O/P EST MOD 30 MIN: CPT | Performed by: FAMILY MEDICINE

## 2018-10-25 NOTE — PROGRESS NOTES
Subjective:      Sam Ayala is a 73 y.o. male who presents with Hypertension            HPI     Patient returns for follow-up of his medical problems.    For his hypertension, this is under control on his medications which are losartan/HCTZ, metoprolol and amlodipine.  He takes KCl to avoid hypokalemia.  Patient denies side effects    In terms of impaired fasting blood sugar, he continues to manage this with his own efforts.    For the dyslipidemia, he continues to take atorvastatin without myalgias.    We follow him for gout.  He takes allopurinol regularly.  He has not had any gout attacks in a long time.    He was given Tdap prescription to get from a local drugstore but he has not received that yet.  He needs a new prescription.  He already had his flu shot.  He is still waiting for shingrix to be available in the local drugstore's.    Past medical history, past surgical history, family history reviewed-no changes    Social history reviewed-no changes    Allergies reviewed-no changes    Medications reviewed-no changes    ROS     As per HPI, the rest are negative for       Objective:     /70   Pulse 71   Temp 36.4 °C (97.6 °F) (Temporal)   Ht 1.829 m (6')   Wt 102.6 kg (226 lb 3.1 oz)   SpO2 94%   BMI 30.68 kg/m²      Physical Exam     Examined alert, awake, oriented, not in distress    Neck-supple, no lymphadenopathy, no thyromegaly  Lungs-clear to auscultation, no rales, no wheezes  Heart-regular rate and rhythm, no murmur  Extremities-no edema, clubbing, cyanosis       Hospital Outpatient Visit on 10/19/2018   Component Date Value   • Cholesterol,Tot 10/19/2018 149    • Triglycerides 10/19/2018 88    • HDL 10/19/2018 51    • LDL 10/19/2018 80    • Sodium 10/19/2018 143    • Potassium 10/19/2018 3.8    • Chloride 10/19/2018 107    • Co2 10/19/2018 29    • Anion Gap 10/19/2018 7.0    • Glucose 10/19/2018 110*   • Bun 10/19/2018 14    • Creatinine 10/19/2018 0.99    • Calcium 10/19/2018 9.8     • AST(SGOT) 10/19/2018 24    • ALT(SGPT) 10/19/2018 38    • Alkaline Phosphatase 10/19/2018 51    • Total Bilirubin 10/19/2018 0.8    • Albumin 10/19/2018 4.4    • Total Protein 10/19/2018 7.0    • Globulin 10/19/2018 2.6    • A-G Ratio 10/19/2018 1.7    • Glycohemoglobin 10/19/2018 6.0*   • Est Avg Glucose 10/19/2018 126    • WBC 10/19/2018 4.8    • RBC 10/19/2018 4.72    • Hemoglobin 10/19/2018 14.3    • Hematocrit 10/19/2018 44.5    • MCV 10/19/2018 94.3    • MCH 10/19/2018 30.3    • MCHC 10/19/2018 32.1*   • RDW 10/19/2018 45.5    • Platelet Count 10/19/2018 238    • MPV 10/19/2018 10.1    • Neutrophils-Polys 10/19/2018 54.60    • Lymphocytes 10/19/2018 31.80    • Monocytes 10/19/2018 11.40    • Eosinophils 10/19/2018 1.20    • Basophils 10/19/2018 0.80    • Immature Granulocytes 10/19/2018 0.20    • Nucleated RBC 10/19/2018 0.00    • Neutrophils (Absolute) 10/19/2018 2.64    • Lymphs (Absolute) 10/19/2018 1.54    • Monos (Absolute) 10/19/2018 0.55    • Eos (Absolute) 10/19/2018 0.06    • Baso (Absolute) 10/19/2018 0.04    • Immature Granulocytes (a* 10/19/2018 0.01    • NRBC (Absolute) 10/19/2018 0.00    • 25-Hydroxy   Vitamin D 25 10/19/2018 24*   • Prostatic Specific Antig* 10/19/2018 1.47    • Fasting Status 10/19/2018 Fasting    • GFR If  10/19/2018 >60    • GFR If Non  Ameri* 10/19/2018 >60         Assessment/Plan:     1. Essential hypertension  Controlled on his medications.  - HEMOGLOBIN A1C; Future  - LIPID PROFILE; Future  - COMP METABOLIC PANEL; Future  - VITAMIN D,25 HYDROXY; Future  - URIC ACID; Future    2. Impaired fasting blood sugar  Fasting blood sugar is still borderline elevated but nondiabetic level.  He also borderline elevated and prediabetic.  Advised to continue to avoid sweets and decrease the carbs.  Advised regular exercise and keeping a healthy weight  - HEMOGLOBIN A1C; Future  - LIPID PROFILE; Future  - COMP METABOLIC PANEL; Future  - VITAMIN D,25 HYDROXY;  Future  - URIC ACID; Future    3. Dyslipidemia  These are all at target.  Continue cholesterol medication.  - HEMOGLOBIN A1C; Future  - LIPID PROFILE; Future  - COMP METABOLIC PANEL; Future  - VITAMIN D,25 HYDROXY; Future  - URIC ACID; Future    4. Gout, arthritis  Continue allopurinol.  We will check uric acid level next visit.  Avoid alcohol which causes most of the gout flareup.  - HEMOGLOBIN A1C; Future  - LIPID PROFILE; Future  - COMP METABOLIC PANEL; Future  - VITAMIN D,25 HYDROXY; Future  - URIC ACID; Future    5. Vitamin D deficiency  Vitamin D is slightly low.  Start over-the-counter vitamin D 1000 international units daily and check vitamin D level next visit.  - HEMOGLOBIN A1C; Future  - LIPID PROFILE; Future  - COMP METABOLIC PANEL; Future  - VITAMIN D,25 HYDROXY; Future  - URIC ACID; Future    6. Need for diphtheria-tetanus-pertussis (Tdap) vaccine  Updated prescription given for Tdap.  - tetanus-dipth-acell pertussis (ADACEL) 5-2-15.5 LF-MCG/0.5 Suspension; 0.5 mL by Intramuscular route Once PRN for up to 1 dose.  Dispense: 0.5 mL; Refill: 0      Please note that this dictation was created using voice recognition software. I have worked with consultants from the vendor as well as technical experts from [employer] to optimize the interface. I have made every reasonable attempt to correct obvious errors, but I expect that there are errors of grammar and possibly content I did not discover before finalizing the note.

## 2019-01-28 ENCOUNTER — APPOINTMENT (OUTPATIENT)
Dept: RADIOLOGY | Facility: MEDICAL CENTER | Age: 74
End: 2019-01-28
Attending: EMERGENCY MEDICINE
Payer: MEDICARE

## 2019-01-28 ENCOUNTER — HOSPITAL ENCOUNTER (OUTPATIENT)
Facility: MEDICAL CENTER | Age: 74
End: 2019-01-28
Attending: EMERGENCY MEDICINE | Admitting: SURGERY
Payer: MEDICARE

## 2019-01-28 VITALS
OXYGEN SATURATION: 98 % | HEIGHT: 71 IN | RESPIRATION RATE: 15 BRPM | DIASTOLIC BLOOD PRESSURE: 57 MMHG | SYSTOLIC BLOOD PRESSURE: 128 MMHG | TEMPERATURE: 97.3 F | WEIGHT: 221.78 LBS | BODY MASS INDEX: 31.05 KG/M2 | HEART RATE: 66 BPM

## 2019-01-28 DIAGNOSIS — G89.18 PAIN FOLLOWING SURGERY OR PROCEDURE: ICD-10-CM

## 2019-01-28 LAB
ALBUMIN SERPL BCP-MCNC: 4.1 G/DL (ref 3.2–4.9)
ALBUMIN/GLOB SERPL: 1.7 G/DL
ALP SERPL-CCNC: 58 U/L (ref 30–99)
ALT SERPL-CCNC: 26 U/L (ref 2–50)
ANION GAP SERPL CALC-SCNC: 8 MMOL/L (ref 0–11.9)
APTT PPP: 36.6 SEC (ref 24.7–36)
AST SERPL-CCNC: 13 U/L (ref 12–45)
BASOPHILS # BLD AUTO: 0.1 % (ref 0–1.8)
BASOPHILS # BLD: 0.02 K/UL (ref 0–0.12)
BILIRUB SERPL-MCNC: 1.5 MG/DL (ref 0.1–1.5)
BUN SERPL-MCNC: 20 MG/DL (ref 8–22)
CALCIUM SERPL-MCNC: 9.7 MG/DL (ref 8.5–10.5)
CHLORIDE SERPL-SCNC: 103 MMOL/L (ref 96–112)
CO2 SERPL-SCNC: 24 MMOL/L (ref 20–33)
CREAT SERPL-MCNC: 0.92 MG/DL (ref 0.5–1.4)
EKG IMPRESSION: NORMAL
EOSINOPHIL # BLD AUTO: 0.09 K/UL (ref 0–0.51)
EOSINOPHIL NFR BLD: 0.5 % (ref 0–6.9)
ERYTHROCYTE [DISTWIDTH] IN BLOOD BY AUTOMATED COUNT: 42.8 FL (ref 35.9–50)
GLOBULIN SER CALC-MCNC: 2.4 G/DL (ref 1.9–3.5)
GLUCOSE SERPL-MCNC: 114 MG/DL (ref 65–99)
HCT VFR BLD AUTO: 45.9 % (ref 42–52)
HGB BLD-MCNC: 15.4 G/DL (ref 14–18)
IMM GRANULOCYTES # BLD AUTO: 0.09 K/UL (ref 0–0.11)
IMM GRANULOCYTES NFR BLD AUTO: 0.5 % (ref 0–0.9)
INR PPP: 1.06 (ref 0.87–1.13)
LIPASE SERPL-CCNC: 19 U/L (ref 11–82)
LYMPHOCYTES # BLD AUTO: 2.23 K/UL (ref 1–4.8)
LYMPHOCYTES NFR BLD: 12.2 % (ref 22–41)
MCH RBC QN AUTO: 30 PG (ref 27–33)
MCHC RBC AUTO-ENTMCNC: 33.6 G/DL (ref 33.7–35.3)
MCV RBC AUTO: 89.5 FL (ref 81.4–97.8)
MONOCYTES # BLD AUTO: 2.08 K/UL (ref 0–0.85)
MONOCYTES NFR BLD AUTO: 11.4 % (ref 0–13.4)
NEUTROPHILS # BLD AUTO: 13.8 K/UL (ref 1.82–7.42)
NEUTROPHILS NFR BLD: 75.3 % (ref 44–72)
NRBC # BLD AUTO: 0 K/UL
NRBC BLD-RTO: 0 /100 WBC
PATHOLOGY CONSULT NOTE: NORMAL
PLATELET # BLD AUTO: 215 K/UL (ref 164–446)
PMV BLD AUTO: 9.9 FL (ref 9–12.9)
POTASSIUM SERPL-SCNC: 3.7 MMOL/L (ref 3.6–5.5)
PROT SERPL-MCNC: 6.5 G/DL (ref 6–8.2)
PROTHROMBIN TIME: 13.9 SEC (ref 12–14.6)
RBC # BLD AUTO: 5.13 M/UL (ref 4.7–6.1)
SODIUM SERPL-SCNC: 135 MMOL/L (ref 135–145)
WBC # BLD AUTO: 18.3 K/UL (ref 4.8–10.8)

## 2019-01-28 PROCEDURE — 160036 HCHG PACU - EA ADDL 30 MINS PHASE I: Performed by: SURGERY

## 2019-01-28 PROCEDURE — 700111 HCHG RX REV CODE 636 W/ 250 OVERRIDE (IP)

## 2019-01-28 PROCEDURE — 160048 HCHG OR STATISTICAL LEVEL 1-5: Performed by: SURGERY

## 2019-01-28 PROCEDURE — 500868 HCHG NEEDLE, SURGI(VARES): Performed by: SURGERY

## 2019-01-28 PROCEDURE — 85025 COMPLETE CBC W/AUTO DIFF WBC: CPT

## 2019-01-28 PROCEDURE — 700101 HCHG RX REV CODE 250

## 2019-01-28 PROCEDURE — 700105 HCHG RX REV CODE 258: Performed by: EMERGENCY MEDICINE

## 2019-01-28 PROCEDURE — 36415 COLL VENOUS BLD VENIPUNCTURE: CPT

## 2019-01-28 PROCEDURE — 85610 PROTHROMBIN TIME: CPT

## 2019-01-28 PROCEDURE — 160009 HCHG ANES TIME/MIN: Performed by: SURGERY

## 2019-01-28 PROCEDURE — 502652 HCHG STAPLES, ETHICON ECR60: Performed by: SURGERY

## 2019-01-28 PROCEDURE — 160035 HCHG PACU - 1ST 60 MINS PHASE I: Performed by: SURGERY

## 2019-01-28 PROCEDURE — 96376 TX/PRO/DX INJ SAME DRUG ADON: CPT | Mod: XU

## 2019-01-28 PROCEDURE — 83690 ASSAY OF LIPASE: CPT

## 2019-01-28 PROCEDURE — 700102 HCHG RX REV CODE 250 W/ 637 OVERRIDE(OP): Performed by: ANESTHESIOLOGY

## 2019-01-28 PROCEDURE — 99285 EMERGENCY DEPT VISIT HI MDM: CPT

## 2019-01-28 PROCEDURE — 501582 HCHG TROCAR, THRD BLADED: Performed by: SURGERY

## 2019-01-28 PROCEDURE — G0378 HOSPITAL OBSERVATION PER HR: HCPCS

## 2019-01-28 PROCEDURE — 93005 ELECTROCARDIOGRAM TRACING: CPT | Performed by: SURGERY

## 2019-01-28 PROCEDURE — 501574 HCHG TROCAR, SMTH CAN&SEAL 5: Performed by: SURGERY

## 2019-01-28 PROCEDURE — A9270 NON-COVERED ITEM OR SERVICE: HCPCS | Performed by: ANESTHESIOLOGY

## 2019-01-28 PROCEDURE — 93010 ELECTROCARDIOGRAM REPORT: CPT | Performed by: INTERNAL MEDICINE

## 2019-01-28 PROCEDURE — 700111 HCHG RX REV CODE 636 W/ 250 OVERRIDE (IP): Performed by: ANESTHESIOLOGY

## 2019-01-28 PROCEDURE — 85730 THROMBOPLASTIN TIME PARTIAL: CPT

## 2019-01-28 PROCEDURE — 700105 HCHG RX REV CODE 258

## 2019-01-28 PROCEDURE — 501586 HCHG TROCAR, THRD SPIKE 5X55: Performed by: SURGERY

## 2019-01-28 PROCEDURE — 500800 HCHG LAPAROSCOPIC J/L HOOK: Performed by: SURGERY

## 2019-01-28 PROCEDURE — A6402 STERILE GAUZE <= 16 SQ IN: HCPCS | Performed by: SURGERY

## 2019-01-28 PROCEDURE — 80053 COMPREHEN METABOLIC PANEL: CPT

## 2019-01-28 PROCEDURE — 501399 HCHG SPECIMAN BAG, ENDO CATC: Performed by: SURGERY

## 2019-01-28 PROCEDURE — 700111 HCHG RX REV CODE 636 W/ 250 OVERRIDE (IP): Performed by: SURGERY

## 2019-01-28 PROCEDURE — 160039 HCHG SURGERY MINUTES - EA ADDL 1 MIN LEVEL 3: Performed by: SURGERY

## 2019-01-28 PROCEDURE — 700117 HCHG RX CONTRAST REV CODE 255: Performed by: EMERGENCY MEDICINE

## 2019-01-28 PROCEDURE — 160002 HCHG RECOVERY MINUTES (STAT): Performed by: SURGERY

## 2019-01-28 PROCEDURE — 501443 HCHG STAPLER, ROTIC. FLEX: Performed by: SURGERY

## 2019-01-28 PROCEDURE — 700111 HCHG RX REV CODE 636 W/ 250 OVERRIDE (IP): Performed by: EMERGENCY MEDICINE

## 2019-01-28 PROCEDURE — 700105 HCHG RX REV CODE 258: Performed by: SURGERY

## 2019-01-28 PROCEDURE — 502571 HCHG PACK, LAP CHOLE: Performed by: SURGERY

## 2019-01-28 PROCEDURE — 160028 HCHG SURGERY MINUTES - 1ST 30 MINS LEVEL 3: Performed by: SURGERY

## 2019-01-28 PROCEDURE — 88304 TISSUE EXAM BY PATHOLOGIST: CPT

## 2019-01-28 PROCEDURE — 501838 HCHG SUTURE GENERAL: Performed by: SURGERY

## 2019-01-28 PROCEDURE — 74177 CT ABD & PELVIS W/CONTRAST: CPT

## 2019-01-28 PROCEDURE — 96365 THER/PROPH/DIAG IV INF INIT: CPT | Mod: XU

## 2019-01-28 RX ORDER — OXYCODONE HCL 5 MG/5 ML
10 SOLUTION, ORAL ORAL
Status: COMPLETED | OUTPATIENT
Start: 2019-01-28 | End: 2019-01-28

## 2019-01-28 RX ORDER — HYDRALAZINE HYDROCHLORIDE 20 MG/ML
5 INJECTION INTRAMUSCULAR; INTRAVENOUS
Status: DISCONTINUED | OUTPATIENT
Start: 2019-01-28 | End: 2019-01-28 | Stop reason: HOSPADM

## 2019-01-28 RX ORDER — SODIUM CHLORIDE, SODIUM LACTATE, POTASSIUM CHLORIDE, CALCIUM CHLORIDE 600; 310; 30; 20 MG/100ML; MG/100ML; MG/100ML; MG/100ML
INJECTION, SOLUTION INTRAVENOUS CONTINUOUS
Status: DISCONTINUED | OUTPATIENT
Start: 2019-01-28 | End: 2019-01-28 | Stop reason: HOSPADM

## 2019-01-28 RX ORDER — HYDROCODONE BITARTRATE AND ACETAMINOPHEN 5; 325 MG/1; MG/1
1-2 TABLET ORAL EVERY 6 HOURS PRN
Status: DISCONTINUED | OUTPATIENT
Start: 2019-01-28 | End: 2019-01-28 | Stop reason: HOSPADM

## 2019-01-28 RX ORDER — MORPHINE SULFATE 4 MG/ML
4 INJECTION, SOLUTION INTRAMUSCULAR; INTRAVENOUS
Status: DISCONTINUED | OUTPATIENT
Start: 2019-01-28 | End: 2019-01-28 | Stop reason: HOSPADM

## 2019-01-28 RX ORDER — ONDANSETRON 2 MG/ML
4 INJECTION INTRAMUSCULAR; INTRAVENOUS
Status: COMPLETED | OUTPATIENT
Start: 2019-01-28 | End: 2019-01-28

## 2019-01-28 RX ORDER — FLUTICASONE PROPIONATE 50 MCG
1 SPRAY, SUSPENSION (ML) NASAL
COMMUNITY
End: 2019-02-18 | Stop reason: SDUPTHER

## 2019-01-28 RX ORDER — ONDANSETRON 4 MG/1
4 TABLET, ORALLY DISINTEGRATING ORAL
Status: DISCONTINUED | OUTPATIENT
Start: 2019-01-28 | End: 2019-01-28 | Stop reason: HOSPADM

## 2019-01-28 RX ORDER — IPRATROPIUM BROMIDE AND ALBUTEROL SULFATE 2.5; .5 MG/3ML; MG/3ML
3 SOLUTION RESPIRATORY (INHALATION)
Status: DISCONTINUED | OUTPATIENT
Start: 2019-01-28 | End: 2019-01-28 | Stop reason: HOSPADM

## 2019-01-28 RX ORDER — HYDROCODONE BITARTRATE AND ACETAMINOPHEN 5; 325 MG/1; MG/1
1-2 TABLET ORAL EVERY 6 HOURS PRN
Qty: 10 TAB | Refills: 0 | Status: SHIPPED | OUTPATIENT
Start: 2019-01-28 | End: 2019-02-02

## 2019-01-28 RX ORDER — DIPHENHYDRAMINE HYDROCHLORIDE 50 MG/ML
12.5 INJECTION INTRAMUSCULAR; INTRAVENOUS
Status: DISCONTINUED | OUTPATIENT
Start: 2019-01-28 | End: 2019-01-28 | Stop reason: HOSPADM

## 2019-01-28 RX ORDER — OXYCODONE HCL 5 MG/5 ML
5 SOLUTION, ORAL ORAL
Status: COMPLETED | OUTPATIENT
Start: 2019-01-28 | End: 2019-01-28

## 2019-01-28 RX ORDER — HALOPERIDOL 5 MG/ML
1 INJECTION INTRAMUSCULAR
Status: DISCONTINUED | OUTPATIENT
Start: 2019-01-28 | End: 2019-01-28 | Stop reason: HOSPADM

## 2019-01-28 RX ORDER — SODIUM CHLORIDE 9 MG/ML
INJECTION, SOLUTION INTRAVENOUS
Status: COMPLETED
Start: 2019-01-28 | End: 2019-01-28

## 2019-01-28 RX ORDER — MEPERIDINE HYDROCHLORIDE 25 MG/ML
12.5 INJECTION INTRAMUSCULAR; INTRAVENOUS; SUBCUTANEOUS
Status: DISCONTINUED | OUTPATIENT
Start: 2019-01-28 | End: 2019-01-28 | Stop reason: HOSPADM

## 2019-01-28 RX ORDER — SODIUM CHLORIDE 9 MG/ML
INJECTION, SOLUTION INTRAVENOUS CONTINUOUS
Status: DISCONTINUED | OUTPATIENT
Start: 2019-01-28 | End: 2019-01-28 | Stop reason: HOSPADM

## 2019-01-28 RX ORDER — HYDROMORPHONE HYDROCHLORIDE 1 MG/ML
0.2 INJECTION, SOLUTION INTRAMUSCULAR; INTRAVENOUS; SUBCUTANEOUS
Status: DISCONTINUED | OUTPATIENT
Start: 2019-01-28 | End: 2019-01-28 | Stop reason: HOSPADM

## 2019-01-28 RX ORDER — AMOXICILLIN 500 MG/1
2000 CAPSULE ORAL ONCE
Status: ON HOLD | COMMUNITY
End: 2019-01-28

## 2019-01-28 RX ORDER — HYDROMORPHONE HYDROCHLORIDE 1 MG/ML
0.5 INJECTION, SOLUTION INTRAMUSCULAR; INTRAVENOUS; SUBCUTANEOUS
Status: DISCONTINUED | OUTPATIENT
Start: 2019-01-28 | End: 2019-01-28 | Stop reason: HOSPADM

## 2019-01-28 RX ORDER — HYDROMORPHONE HYDROCHLORIDE 1 MG/ML
0.4 INJECTION, SOLUTION INTRAMUSCULAR; INTRAVENOUS; SUBCUTANEOUS
Status: DISCONTINUED | OUTPATIENT
Start: 2019-01-28 | End: 2019-01-28 | Stop reason: HOSPADM

## 2019-01-28 RX ORDER — BUPIVACAINE HYDROCHLORIDE 2.5 MG/ML
INJECTION, SOLUTION EPIDURAL; INFILTRATION; INTRACAUDAL
Status: DISCONTINUED | OUTPATIENT
Start: 2019-01-28 | End: 2019-01-28 | Stop reason: HOSPADM

## 2019-01-28 RX ORDER — SODIUM CHLORIDE, SODIUM LACTATE, POTASSIUM CHLORIDE, CALCIUM CHLORIDE 600; 310; 30; 20 MG/100ML; MG/100ML; MG/100ML; MG/100ML
INJECTION, SOLUTION INTRAVENOUS
Status: COMPLETED | OUTPATIENT
Start: 2019-01-28 | End: 2019-01-28

## 2019-01-28 RX ADMIN — PIPERACILLIN AND TAZOBACTAM 3.38 G: 3; .375 INJECTION, POWDER, LYOPHILIZED, FOR SOLUTION INTRAVENOUS; PARENTERAL at 04:24

## 2019-01-28 RX ADMIN — OXYCODONE HYDROCHLORIDE 5 MG: 5 SOLUTION ORAL at 10:16

## 2019-01-28 RX ADMIN — PIPERACILLIN AND TAZOBACTAM 3.38 G: 3; .375 INJECTION, POWDER, LYOPHILIZED, FOR SOLUTION INTRAVENOUS; PARENTERAL at 05:29

## 2019-01-28 RX ADMIN — SODIUM CHLORIDE: 9 INJECTION, SOLUTION INTRAVENOUS at 12:00

## 2019-01-28 RX ADMIN — PIPERACILLIN AND TAZOBACTAM 3.38 G: 3; .375 INJECTION, POWDER, LYOPHILIZED, FOR SOLUTION INTRAVENOUS; PARENTERAL at 13:18

## 2019-01-28 RX ADMIN — SODIUM CHLORIDE: 9 INJECTION, SOLUTION INTRAVENOUS at 05:45

## 2019-01-28 RX ADMIN — ONDANSETRON 4 MG: 2 INJECTION INTRAMUSCULAR; INTRAVENOUS at 10:16

## 2019-01-28 RX ADMIN — IOHEXOL 100 ML: 350 INJECTION, SOLUTION INTRAVENOUS at 03:53

## 2019-01-28 RX ADMIN — SODIUM CHLORIDE, POTASSIUM CHLORIDE, SODIUM LACTATE AND CALCIUM CHLORIDE: 600; 310; 30; 20 INJECTION, SOLUTION INTRAVENOUS at 05:29

## 2019-01-28 ASSESSMENT — LIFESTYLE VARIABLES
EVER FELT BAD OR GUILTY ABOUT YOUR DRINKING: NO
CONSUMPTION TOTAL: NEGATIVE
TOTAL SCORE: 0
TOTAL SCORE: 0
EVER HAD A DRINK FIRST THING IN THE MORNING TO STEADY YOUR NERVES TO GET RID OF A HANGOVER: NO
TOTAL SCORE: 0
ON A TYPICAL DAY WHEN YOU DRINK ALCOHOL HOW MANY DRINKS DO YOU HAVE: 2
DO YOU DRINK ALCOHOL: YES
EVER_SMOKED: NEVER
HAVE PEOPLE ANNOYED YOU BY CRITICIZING YOUR DRINKING: NO
HOW MANY TIMES IN THE PAST YEAR HAVE YOU HAD 5 OR MORE DRINKS IN A DAY: 0
HAVE YOU EVER FELT YOU SHOULD CUT DOWN ON YOUR DRINKING: NO
AVERAGE NUMBER OF DAYS PER WEEK YOU HAVE A DRINK CONTAINING ALCOHOL: 2

## 2019-01-28 ASSESSMENT — COGNITIVE AND FUNCTIONAL STATUS - GENERAL
DAILY ACTIVITIY SCORE: 24
MOBILITY SCORE: 24
SUGGESTED CMS G CODE MODIFIER DAILY ACTIVITY: CH
SUGGESTED CMS G CODE MODIFIER MOBILITY: CH

## 2019-01-28 ASSESSMENT — PATIENT HEALTH QUESTIONNAIRE - PHQ9
2. FEELING DOWN, DEPRESSED, IRRITABLE, OR HOPELESS: NOT AT ALL
SUM OF ALL RESPONSES TO PHQ9 QUESTIONS 1 AND 2: 0
1. LITTLE INTEREST OR PLEASURE IN DOING THINGS: NOT AT ALL

## 2019-01-28 ASSESSMENT — PAIN DESCRIPTION - DESCRIPTORS: DESCRIPTORS: DULL

## 2019-01-28 NOTE — PROGRESS NOTES
Pt arrived with transport in [facility], report given.  A+O x 4, VSS, and RA.   Pt scheduled for Lap Appy this AM, pre-op checklist complete.   Pt strict NPO.  +void  +BM  Pt ambulates self up to bathroom and down halls. Tolerates well.  Pt updated on POC and all questions answered at this time.  Bed in lowest position, call light within reach, and no current needs.

## 2019-01-28 NOTE — ED TRIAGE NOTES
Sam Ayala  73 y.o.  male  Chief Complaint   Patient presents with   • RLQ Pain     x 1.5 day     Present to triage c/o RLQ pain since yesterday. Denies N/V. Described as constant dull pain. Non radiating.

## 2019-01-28 NOTE — CARE PLAN
Problem: Safety  Goal: Will remain free from injury  Outcome: PROGRESSING AS EXPECTED  Patient educated to call for assistance before getting out of bed.  Call light within reach.      Problem: Infection  Goal: Will remain free from infection  Outcome: PROGRESSING AS EXPECTED  Patient given IV zosyn per MAR.

## 2019-01-28 NOTE — CARE PLAN
Problem: Communication  Goal: The ability to communicate needs accurately and effectively will improve  Outcome: PROGRESSING AS EXPECTED  Pt to go to surgery for a lap appy today. All questions answered at this time. Hourly rounding in place.    Problem: Safety  Goal: Will remain free from injury  Outcome: PROGRESSING AS EXPECTED  Proper fall precautions in place. Call light within reach and encouraged to use. Hourly rounding in practice.

## 2019-01-28 NOTE — H&P
DATE OF ADMISSION:  01/28/2019    IDENTIFICATION:  This is a 73-year-old male.    HISTORY OF PRESENT ILLNESS:  The patient was in his usual state of health   until 2 days ago when he started getting right lower quadrant abdominal pain.    It progressively worsened.  He had chills last night and finally was brought   to the emergency room for evaluation.  He was found to have a white count of   18,000 and a CT scan showing acute appendicitis.  I have been asked to see him   in regards to this.    PAST MEDICAL HISTORY:  Hypertension, gout, and hypercholesterolemia.    PAST SURGICAL HISTORY:  Knee replacement, a left inguinal hernia repair, 2   lumbar back surgeries, knee arthroscopy, bilateral carpal tunnel, and a   tonsillectomy.    MEDICATIONS:  Metoprolol, allopurinol, Lipitor, Hyzaar, Mobic, and Ultram.    ALLERGIES:  None.    SOCIAL HISTORY:  He is .  He a remote smoker, having stopped several   years ago.  He does drink occasionally.    REVIEW OF SYSTEMS:  Otherwise unremarkable.    PHYSICAL EXAMINATION:  VITAL SIGNS:  He is afebrile.  GENERAL:  He is anicteric.  NECK:  Supple.  ABDOMEN:  Soft with right lower quadrant abdominal pain.  He does not have   peritonitis.  EXTREMITIES:  Without edema.  NEUROLOGIC:  Intact.    LABORATORY DATA:  White count is 18,000.  Electrolytes are normal.  CT scan as   noted above.    IMPRESSION:  A 73-year-old male with acute appendicitis.    PLAN:  For laparoscopic appendectomy.  Procedure was described to the patient   as well as risks including bleeding, infection, conversion to an open   procedure and anesthetic risks.  He understands and wishes to proceed.       ____________________________________     YVONNE ACOSTA MD    [facility]    DD:  01/28/2019 09:09:49  DT:  01/28/2019 10:50:52    D#:  [id number]  Job#:  [id number]    cc: Bonnie Olsen MD

## 2019-01-28 NOTE — PROGRESS NOTES
Received report from evening RN.  Assumed care of patient.  Patient A&Ox4.  C/O pain 4/10 in RLQ abdomen. .  No nausea or vomiting.  No numbness/tingling.  Ambulating with a steady gait.  +void.  Skin intact.    Patient to pre-op with transport.  Wife at bedside.

## 2019-01-28 NOTE — PROGRESS NOTES
Med rec completed per pt at bedside  Allergies reviewed    Pt took 2000 mg of Amoxicillin 1/17/19 prior to a dental procedure

## 2019-01-28 NOTE — OP REPORT
DATE OF SERVICE:  01/28/2019    PREOPERATIVE DIAGNOSIS:  Acute appendicitis.    POSTOPERATIVE DIAGNOSIS:  Acute appendicitis.    PROCEDURE PERFORMED:    1.  Laparoscopic appendectomy.    2.  Umbilical hernia repair.    SURGEON:  Priscila Huggins MD    ASSISTANT:  BRAYAN Duncan    ANESTHESIA:  General endotracheal.    ANESTHESIOLOGIST:  Bryant Christiansen MD    INDICATIONS:  The patient is a 73-year-old gentleman who had presented with   2-day history of abdominal pain.  He was found to have on CT scan acute   appendicitis.  He is being brought at this time for laparoscopic appendectomy.        FINDINGS:    1.  Acute appendicitis that was necrotic, but there was no abscess.  The   appendectomy was performed.  2.  He had a 2 cm umbilical hernia that was also primarily repaired.    DESCRIPTION OF PROCEDURE:  After the patient was identified and consented, he   was brought to the operating room and placed in supine position.  Patient   underwent general endotracheal anesthetic clearance.  Patient's abdomen was   prepped and draped in sterile fashion.  Periumbilical area was anesthetized   with 0.25% Marcaine, he has a known umbilical hernia, which we will plan on   repairing when we close.  A 2 cm infraumbilical incision was carried out.  The   abdominal wall was lifted up and Veress needle was introduced into the   abdominal cavity.  After positive drop test, pneumoperitoneum was obtained.    The Veress needle was removed.  A 5-mm trocar was placed.  Under laparoscopic   guidance, a 5 mm trocars was placed in right subcostal position and 12 mm   trocar was placed in the suprapubic position.  ____ identified, but it was   walled off and appeared the patient not have perforated, but was incredibly   inflamed and scarred.  This was mobilized back to the base of the appendix,   was amputated with an [place] stapler, placed in an EndoCatch, and brought   through suprapubic port.  Appendiceal bed was irrigated and  hemostasis   secured.  Port sites removed and pneumoperitoneum released.  Upon closure, the   umbilical hernia sac was opened.  The fascia was then defined   circumferentially and closed with interrupted 0 Ethibond.  Subcutaneous tissue   was tacked down with 4-0 Vicryl, skin was closed with running 4-0 Vicryls.    All the incisions were closed with 4-0 Vicryls.  Op-Site dressing placed on   the wounds.  Patient was extubated and taken to recovery in stable condition.    All sponge and needle counts were correct.       ____________________________________     YVONNE ACOSTA MD    [facility]    DD:  01/28/2019 09:51:25  DT:  01/28/2019 11:53:44    D#:  [id number]  Job#:  [id number]    cc: Bonnie Olsen MD, Bryant Christiansen MD

## 2019-01-28 NOTE — ED PROVIDER NOTES
ED Provider Note    ED Provider Note      Primary care provider: Bonnie Olsen M.D.    CHIEF COMPLAINT  Chief Complaint   Patient presents with   • RLQ Pain     x 1.5 day       HPI  Sam Ayala is a 73 y.o. male who presents to the Emergency Department with chief complaint of right lower quadrant abdominal pain.  Patient reports that he has been having mild pain over the last 2 days more severe this evening.  Currently rates it as an 8 out of 10.  States that he began concerned that the pain was not going away that it was getting slightly worse and when he palpated the area and had worsening of the pain he was concerned that something may be going he has had minor nausea no fevers no chills no headache no cough congestion chest pain or shortness of breath.  He reports a sense of having to go to the bathroom frequently but not denies overt constipation or diarrhea no blood in his stool no urinary complaints no other symptoms or concerns at this time    REVIEW OF SYSTEMS  10 systems reviewed and otherwise negative, pertinent positives and negatives listed in the history of present illness.    PAST MEDICAL HISTORY   has a past medical history of Allergic rhinitis; Arrhythmia; Arthritis; BPH; Bronchitis (1980's); CATARACT; chronic back pain; Chronic neck pain; COPD (chronic obstructive pulmonary disease) (HCC); Dyslipidemia; Gout; Groin pain, left lower quadrant (7/18/2013); HTN; Hypertension; Impaired fasting blood sugar; OA (osteoarthritis) of knee; KAYLIE (obstructive sleep apnea); Pneumonia (1988); S/P carpal tunnel release; Snoring; Torn  meniscus (7/07); and Unspecified hemorrhagic conditions.    SURGICAL HISTORY   has a past surgical history that includes arthroscopy, knee; colonoscopy with polyp (5/09 ); colonoscopy with polyp (4/06); other orthopedic surgery; other orthopedic surgery (2009); other orthopedic surgery (1996); other orthopedic surgery (1997); other; lumbar laminectomy diskectomy (6/1/2011);  foraminotomy (6/1/2011); knee arthroplasty total (12/15/2011); lumbar decompression (9/25/2012); lumbar laminectomy diskectomy (9/25/2012); cyst excision (9/25/2012); colonoscopy with polyp (4/10/2013); inguinal hernia laparoscopic (8/22/2013); colonoscopy with polyp (10/12/16); cataract extraction with iol (left-2/7/17, right-2/21/17); and colonoscopy with polyp (01/10/2019).    SOCIAL HISTORY  Social History   Substance Use Topics   • Smoking status: Former Smoker     Packs/day: 2.00     Years: 25.00     Types: Cigarettes     Quit date: 5/23/1988   • Smokeless tobacco: Never Used      Comment: quit 1998   • Alcohol use 0.0 oz/week      Comment: 1-2 glasses/day, beer on weekends      History   Drug Use No       FAMILY HISTORY  Non-Contributory    CURRENT MEDICATIONS  Home Medications    **Home medications have not yet been reviewed for this encounter**         ALLERGIES  Allergies   Allergen Reactions   • Nkda [No Known Drug Allergy]        PHYSICAL EXAM  VITAL SIGNS: /77   Pulse 82   Temp 37 °C (98.6 °F) (Temporal)   Resp 16   Ht 1.829 m (6')   Wt 103.6 kg (228 lb 6.3 oz)   SpO2 94%   BMI 30.98 kg/m²   Pulse ox interpretation: I interpret this pulse ox as normal.  Constitutional: Alert and oriented x 3, minimal distress  HEENT: Atraumatic normocephalic, pupils are equal round reactive to light extraocular movements are intact. The nares is clear, external ears are normal, mouth shows moist mucous membranes  Neck: Supple, no JVD no tracheal deviation  Cardiovascular: Regular rate and rhythm no murmur rub or gallop 2+ pulses peripherally x4  Thorax & Lungs: No respiratory distress, no wheezes rales or rhonchi, No chest tenderness.   GI: Tender to palpation in the right lower quadrant no rebound no guarding no peritoneal signs negative Rovsing's negative heel tap and psoas signs  Skin: Warm dry no acute rash or lesion  Musculoskeletal: Moving all extremities with full range and 5 of 5 strength, no acute   deformity  Neurologic: Cranial nerves III through XII are grossly intact, no sensory deficit, no cerebellar dysfunction   Psychiatric: Appropriate affect for situation at this time      DIAGNOSTIC STUDIES / PROCEDURES  LABS      Results for orders placed or performed during the hospital encounter of 01/28/19   CBC WITH DIFFERENTIAL   Result Value Ref Range    WBC 18.3 (H) 4.8 - 10.8 K/uL    RBC 5.13 4.70 - 6.10 M/uL    Hemoglobin 15.4 14.0 - 18.0 g/dL    Hematocrit 45.9 42.0 - 52.0 %    MCV 89.5 81.4 - 97.8 fL    MCH 30.0 27.0 - 33.0 pg    MCHC 33.6 (L) 33.7 - 35.3 g/dL    RDW 42.8 35.9 - 50.0 fL    Platelet Count 215 164 - 446 K/uL    MPV 9.9 9.0 - 12.9 fL    Neutrophils-Polys 75.30 (H) 44.00 - 72.00 %    Lymphocytes 12.20 (L) 22.00 - 41.00 %    Monocytes 11.40 0.00 - 13.40 %    Eosinophils 0.50 0.00 - 6.90 %    Basophils 0.10 0.00 - 1.80 %    Immature Granulocytes 0.50 0.00 - 0.90 %    Nucleated RBC 0.00 /100 WBC    Neutrophils (Absolute) 13.80 (H) 1.82 - 7.42 K/uL    Lymphs (Absolute) 2.23 1.00 - 4.80 K/uL    Monos (Absolute) 2.08 (H) 0.00 - 0.85 K/uL    Eos (Absolute) 0.09 0.00 - 0.51 K/uL    Baso (Absolute) 0.02 0.00 - 0.12 K/uL    Immature Granulocytes (abs) 0.09 0.00 - 0.11 K/uL    NRBC (Absolute) 0.00 K/uL   COMP METABOLIC PANEL   Result Value Ref Range    Sodium 135 135 - 145 mmol/L    Potassium 3.7 3.6 - 5.5 mmol/L    Chloride 103 96 - 112 mmol/L    Co2 24 20 - 33 mmol/L    Anion Gap 8.0 0.0 - 11.9    Glucose 114 (H) 65 - 99 mg/dL    Bun 20 8 - 22 mg/dL    Creatinine 0.92 0.50 - 1.40 mg/dL    Calcium 9.7 8.5 - 10.5 mg/dL    AST(SGOT) 13 12 - 45 U/L    ALT(SGPT) 26 2 - 50 U/L    Alkaline Phosphatase 58 30 - 99 U/L    Total Bilirubin 1.5 0.1 - 1.5 mg/dL    Albumin 4.1 3.2 - 4.9 g/dL    Total Protein 6.5 6.0 - 8.2 g/dL    Globulin 2.4 1.9 - 3.5 g/dL    A-G Ratio 1.7 g/dL   LIPASE   Result Value Ref Range    Lipase 19 11 - 82 U/L   PROTHROMBIN TIME (INR)   Result Value Ref Range    PT 13.9 12.0 - 14.6 sec     INR 1.06 0.87 - 1.13   APTT   Result Value Ref Range    APTT 36.6 (H) 24.7 - 36.0 sec   ESTIMATED GFR   Result Value Ref Range    GFR If African American >60 >60 mL/min/1.73 m 2    GFR If Non African American >60 >60 mL/min/1.73 m 2       All labs reviewed by me.      RADIOLOGY  No orders to display     The radiologist's interpretation of all radiological studies have been reviewed by me.    COURSE & MEDICAL DECISION MAKING  Pertinent Labs & Imaging studies reviewed. (See chart for details)    2:52 AM - Patient seen and examined at bedside.     Patient noted to have slightly elevated blood pressure likely circumstantial secondary to presenting complaint. Referred to primary care physician for further evaluation.    Patient was given IV fluids based on n.p.o. status and surgical preparation, oral hydration was not attempted due n.p.o. status, after fluids had no change in symptoms    Medical Decision Making: Right lower quadrant pain white count of 18.3 CT scan shows severe strain and dilatation of the appendix without any free intraperitoneal air or evidence of rupture I discussed case with Dr. Huggins general surgeon patient placed on Zosyn given first dose in the emergency department and holding orders placed for maintenance fluids pain and nausea control Zosyn every 6 hours.  Admitted for appendectomy.    /77   Pulse 82   Temp 37 °C (98.6 °F) (Temporal)   Resp 16   Ht 1.829 m (6')   Wt 103.6 kg (228 lb 6.3 oz)   SpO2 94%   BMI 30.98 kg/m²             FINAL IMPRESSION  1.  Abdominal pain  2.  Leukocytosis  3.  Acute appendicitis      This dictation has been created using voice recognition software and/or scribes. The accuracy of the dictation is limited by the abilities of the software and the expertise of the scribes. I expect there may be some errors of grammar and possibly content. I made every attempt to manually correct the errors within my dictation. However, errors related to voice recognition  software and/or scribes may still exist and should be interpreted within the appropriate context.

## 2019-01-28 NOTE — PROGRESS NOTES
Patient arrived on unit from PACU.  Assumed care of patient.  Patient's lap stab sites x3 with dressings CDI.  On regular diet.  No nausea or vomiting.  No numbness/tingling.  Ambulating to bathroom SBA, +void.  C/O pain 4/10 in abdomen, ice packs in place. Plan of care discussed.  Call light within reach.

## 2019-01-29 NOTE — DISCHARGE INSTRUCTIONS
Discharge Instructions    Discharged to home by car with relative. Discharged via wheelchair, hospital escort: Yes.  Special equipment needed: Not Applicable    Be sure to schedule a follow-up appointment with your primary care doctor or any specialists as instructed.     Discharge Plan:   Diet Plan: Discussed  Activity Level: Discussed  Confirmed Follow up Appointment: Patient to Call and Schedule Appointment  Confirmed Symptoms Management: Discussed  Medication Reconciliation Updated: Yes  Influenza Vaccine Indication: Not indicated: Previously immunized this influenza season and > 8 years of age    I understand that a diet low in cholesterol, fat, and sodium is recommended for good health. Unless I have been given specific instructions below for another diet, I accept this instruction as my diet prescription.   Other diet: Regular    Special Instructions: None    · Is patient discharged on Warfarin / Coumadin?   No     Depression / Suicide Risk    As you are discharged from this [facility] facility, it is important to learn how to keep safe from harming yourself.    Recognize the warning signs:  · Abrupt changes in personality, positive or negative- including increase in energy   · Giving away possessions  · Change in eating patterns- significant weight changes-  positive or negative  · Change in sleeping patterns- unable to sleep or sleeping all the time   · Unwillingness or inability to communicate  · Depression  · Unusual sadness, discouragement and loneliness  · Talk of wanting to die  · Neglect of personal appearance   · Rebelliousness- reckless behavior  · Withdrawal from people/activities they love  · Confusion- inability to concentrate     If you or a loved one observes any of these behaviors or has concerns about self-harm, here's what you can do:  · Talk about it- your feelings and reasons for harming yourself  · Remove any means that you might use to hurt yourself (examples: pills, rope, extension  cords, firearm)  · Get professional help from the community (Mental Health, Substance Abuse, psychological counseling)  · Do not be alone:Call your Safe Contact- someone whom you trust who will be there for you.  · Call your local CRISIS HOTLINE 439-8225 or 984-205-9588  · Call your local Children's Mobile Crisis Response Team [place] (027) 803-9598 or www.[employer]  · Call the toll free National Suicide Prevention Hotlines   · National Suicide Prevention Lifeline 188-493-TUYA (1953)  · [employer] 800-SUICIDE (957-1715)          Laparoscopic Appendectomy D/C instructions:     DIET: Upon discharge from the hospital you may resume your normal pre-operative diet. Depending on how you are feeling and whether you have nausea or not, you may wish to stay with a bland diet for the first few days. However, you can advance this as quickly as you feel ready.     ACTIVITIES: After discharge from the hospital, you may resume full routine activities. However, there should be no heavy lifting (greater than 15 pounds) and no strenuous activities until after your follow-up visit. Otherwise, routine activities of daily living are acceptable.     DRIVING: You may drive whenever you are off pain medications and are able to perform the activities needed to drive, i.e. turning, bending, twisting, etc.     BATHING: You may get the wound wet at any time after leaving the hospital. You may shower, but do not submerge in a bath for at least a week. Dressings may come off after 48 hours.     BOWEL FUNCTION: A few patients, after this operation, will develop either frequent or loose stools after meals. This usually corrects itself after a few days, to a few weeks. If this occurs, do not worry; it is not unusual and will resolve. Much more common than loose stools, is constipation. The combination of pain medication and decreased activity level can cause constipation in otherwise normal patients. If you feel this  is occurring, take a laxative (Milk of Magnesia, Ex-Lax, Senokot, etc.) until the problem has resolved.     PAIN MEDICATION: You will be given a prescription for pain medication at discharge. Please take these as directed. It is important to remember not to take medications on an empty stomach as this may cause nausea.     CALL IF YOU HAVE: (1) Fevers to more than 1010 F, (2) Unusual chest or leg pain, (3) Drainage or fluid from incision that may be foul smelling, increased tenderness or soreness at the wound or the wound edges are no longer together, redness or swelling at the incision site. Please do not hesitate to call with any other questions.     APPOINTMENT: Contact our office at [phone] for a follow-up appointment in 1 week following your procedure.     If you have any additional questions, please do not hesitate to call the office.     Office address:   [address] [place] [address], [address] [place] [id number]

## 2019-01-29 NOTE — PROGRESS NOTES
Patient given discharge instructions and prescriptions.  Patient demonstrated understanding.  PIV removed.  Patient to follow up with Dr. Huggins in 1 week. All questions answered.  Patient to discharged via wheelchair with family.

## 2019-05-17 ENCOUNTER — HOSPITAL ENCOUNTER (OUTPATIENT)
Dept: LAB | Facility: MEDICAL CENTER | Age: 74
End: 2019-05-17
Attending: FAMILY MEDICINE
Payer: MEDICARE

## 2019-05-17 DIAGNOSIS — E78.5 DYSLIPIDEMIA: ICD-10-CM

## 2019-05-17 DIAGNOSIS — I10 ESSENTIAL HYPERTENSION: ICD-10-CM

## 2019-05-17 DIAGNOSIS — R73.01 IMPAIRED FASTING BLOOD SUGAR: ICD-10-CM

## 2019-05-17 DIAGNOSIS — E55.9 VITAMIN D DEFICIENCY: ICD-10-CM

## 2019-05-17 DIAGNOSIS — M10.9 GOUT, ARTHRITIS: ICD-10-CM

## 2019-05-17 LAB
25(OH)D3 SERPL-MCNC: 38 NG/ML (ref 30–100)
ALBUMIN SERPL BCP-MCNC: 4.2 G/DL (ref 3.2–4.9)
ALBUMIN/GLOB SERPL: 1.6 G/DL
ALP SERPL-CCNC: 56 U/L (ref 30–99)
ALT SERPL-CCNC: 28 U/L (ref 2–50)
ANION GAP SERPL CALC-SCNC: 5 MMOL/L (ref 0–11.9)
AST SERPL-CCNC: 18 U/L (ref 12–45)
BILIRUB SERPL-MCNC: 1.1 MG/DL (ref 0.1–1.5)
BUN SERPL-MCNC: 18 MG/DL (ref 8–22)
CALCIUM SERPL-MCNC: 9.4 MG/DL (ref 8.5–10.5)
CHLORIDE SERPL-SCNC: 106 MMOL/L (ref 96–112)
CHOLEST SERPL-MCNC: 147 MG/DL (ref 100–199)
CO2 SERPL-SCNC: 29 MMOL/L (ref 20–33)
CREAT SERPL-MCNC: 0.9 MG/DL (ref 0.5–1.4)
EST. AVERAGE GLUCOSE BLD GHB EST-MCNC: 120 MG/DL
FASTING STATUS PATIENT QL REPORTED: NORMAL
GLOBULIN SER CALC-MCNC: 2.6 G/DL (ref 1.9–3.5)
GLUCOSE SERPL-MCNC: 113 MG/DL (ref 65–99)
HBA1C MFR BLD: 5.8 % (ref 0–5.6)
HDLC SERPL-MCNC: 42 MG/DL
LDLC SERPL CALC-MCNC: 78 MG/DL
POTASSIUM SERPL-SCNC: 3.7 MMOL/L (ref 3.6–5.5)
PROT SERPL-MCNC: 6.8 G/DL (ref 6–8.2)
SODIUM SERPL-SCNC: 140 MMOL/L (ref 135–145)
TRIGL SERPL-MCNC: 133 MG/DL (ref 0–149)
URATE SERPL-MCNC: 6.2 MG/DL (ref 2.5–8.3)

## 2019-05-17 PROCEDURE — 80061 LIPID PANEL: CPT

## 2019-05-17 PROCEDURE — 36415 COLL VENOUS BLD VENIPUNCTURE: CPT | Mod: GA

## 2019-05-17 PROCEDURE — 80053 COMPREHEN METABOLIC PANEL: CPT

## 2019-05-17 PROCEDURE — 83036 HEMOGLOBIN GLYCOSYLATED A1C: CPT | Mod: GA

## 2019-05-17 PROCEDURE — 84550 ASSAY OF BLOOD/URIC ACID: CPT

## 2019-05-17 PROCEDURE — 82306 VITAMIN D 25 HYDROXY: CPT

## 2019-05-23 ENCOUNTER — OFFICE VISIT (OUTPATIENT)
Dept: MEDICAL GROUP | Facility: PHYSICIAN GROUP | Age: 74
End: 2019-05-23
Payer: MEDICARE

## 2019-05-23 VITALS
HEIGHT: 72 IN | DIASTOLIC BLOOD PRESSURE: 80 MMHG | SYSTOLIC BLOOD PRESSURE: 160 MMHG | BODY MASS INDEX: 31.35 KG/M2 | OXYGEN SATURATION: 96 % | TEMPERATURE: 73 F | WEIGHT: 231.48 LBS

## 2019-05-23 DIAGNOSIS — M10.9 GOUT, ARTHRITIS: ICD-10-CM

## 2019-05-23 DIAGNOSIS — R22.1 MASS OF NECK: ICD-10-CM

## 2019-05-23 DIAGNOSIS — E66.9 OBESITY (BMI 30-39.9): ICD-10-CM

## 2019-05-23 DIAGNOSIS — I10 ESSENTIAL HYPERTENSION: ICD-10-CM

## 2019-05-23 DIAGNOSIS — R73.01 IMPAIRED FASTING BLOOD SUGAR: ICD-10-CM

## 2019-05-23 DIAGNOSIS — R68.89 ABNORMAL ANKLE BRACHIAL INDEX (ABI): ICD-10-CM

## 2019-05-23 DIAGNOSIS — E78.5 DYSLIPIDEMIA: ICD-10-CM

## 2019-05-23 PROCEDURE — 99214 OFFICE O/P EST MOD 30 MIN: CPT | Performed by: FAMILY MEDICINE

## 2019-05-23 NOTE — PROGRESS NOTES
Subjective:      Sam Ayala is a 74 y.o. male who presents with Hypertension and Pain (shoulders, back, )        HPI:  Patient is here to discuss his chronic medical problems as well as for complaints of shoulder pain, back pain, and a new left sided neck mass.    Essential hypertension  This is a chronic issue for the patient. He is currently taking losartan/HCTZ, metoprolol and amlodipine daily. Patient denies any side effects from his medications. His blood pressure is elevated today at 160/80, and he thinks this could be due to pain or the fact that he has gained weight since his last visit. He otherwise denies chest pain, shortness of breath, headache, palpitations, dizziness, or abnormal leg swelling.  Higher blood pressure reading was under control.    Dyslipidemia  This is a chronic issue for the patient. He continues to take atorvastatin without myalgias.     Impaired fasting blood sugar  In terms of impaired fasting blood sugar, he continues to manage this with his own efforts. His fasting glucose remains elevated at 113. His most recent A1C is consistent with prediabetes at 5.8. Patient reports that he actively tries to avoid drinking alcohol, although he did recently drink a lot of alcohol while staying in [place]. He adds that he has been eating poorly recently as well.  Weight increased 5 pounds since last visit.    Gout, arthritis  We follow him for gout. He takes allopurinol regularly. He has not had any gout attacks in a long time other than when making poor dietary choices during a recent trip to [place]. Patient wears good shoes and orthotics daily to prevent any pain secondary to gout and arthritis.     Mass of neck  Within the last 2-4 days, the patient noticed a small mass to the left side of his neck while he was shaving. The mass is not bothersome or painful to the patient. Initially he thought that it may be a bug bite, although it has not appeared to decrease in size.     The  patient states that he has recently fixed up a trailer with his son during a trip to [place]. At that time, the patient helped his son load a very large tractor onto the trailer. He then jumped 2 feet off of the trailer and really hurt his back, although he did not fall. The patient reports that he has since experienced pain in both his back and left shoulder. He has since met with Dr. Kingston ortho, who conducted an x-ray and gave him a shot last month in his left shoulder. There was no evidence of fracture on the x-ray. Dr. Kingston told the patient that he could take up to 800 mg ibuprofen 3 times daily for his pain. His pain has since resolved.      The patient has a history of two back surgeries. Patient reports that since jumping off of the trailer, he has experienced pain in his back and left hip with numbness radiating down his legs. He has since met with [facility] and completed both an MRI of the lumbar spine as well as x-rays of his left hip. There is no evidence of acute fracture in the patient's back, although there is some foraminal canal narrowing in certain levels and some nerve root contact.  Patient has a follow up appointment scheduled on June 6th for follow up with his spine specialist.     Patient had both an emergency appendectomy as well as an umbilical hernia repair done in January 2019 at the same time by Dr. Huggins. He states that he has noticed a return of his umbilical hernia, and would like to meet with Dr. Huggins to discuss this further as it has begun to bother him.     He had [facility] screening done that showed no problem in the carotid arteries, no A. fib, no abdominal aortic aneurysm but peripheral arterial disease right side abnormal with CHARLI of 1.36.  Left side CHARLI was 1.16.  He denies any claudication pain however.  He said he is able to play 18 holes of golf without the use of a cart.  He is able to walk in the golf course without any pain in the legs.    Past medical history, past  surgical history, family history reviewed-no changes    Social history reviewed-no changes    Allergies reviewed-no changes    Medications reviewed-no changes      ROS:  As per the HPI as shown above, the rest are negative.       Objective:     /80 (BP Location: Left arm, Patient Position: Sitting, BP Cuff Size: Adult)   Temp (!) 22.8 °C (73 °F) (Temporal)   Ht 1.829 m (6')   Wt 105 kg (231 lb 7.7 oz)   SpO2 96%   BMI 31.39 kg/m²     Physical Exam  Examined alert, awake, oriented, not in distress    Neck-supple, no lymphadenopathy, no thyromegaly, there is a 1.5 x 1.5 cm round and firm nontender mass on the anterior neck just above the thyroid primarily on the left side.   Lungs-clear to auscultation, no rales, no wheezes  Heart-regular rate and rhythm, no murmur  Extremities-no edema, clubbing, cyanosis, strong pedal pulses bilaterally, skin warm to touch both feet, no discoloration noted both feet      Labs:  Hospital Outpatient Visit on 05/17/2019   Component Date Value Ref Range Status   • Glycohemoglobin 05/17/2019 5.8* 0.0 - 5.6 % Final   • Est Avg Glucose 05/17/2019 120  mg/dL Final   • Cholesterol,Tot 05/17/2019 147  100 - 199 mg/dL Final   • Triglycerides 05/17/2019 133  0 - 149 mg/dL Final   • HDL 05/17/2019 42  >=40 mg/dL Final   • LDL 05/17/2019 78  <100 mg/dL Final   • Sodium 05/17/2019 140  135 - 145 mmol/L Final   • Potassium 05/17/2019 3.7  3.6 - 5.5 mmol/L Final   • Chloride 05/17/2019 106  96 - 112 mmol/L Final   • Co2 05/17/2019 29  20 - 33 mmol/L Final   • Anion Gap 05/17/2019 5.0  0.0 - 11.9 Final   • Glucose 05/17/2019 113* 65 - 99 mg/dL Final   • Bun 05/17/2019 18  8 - 22 mg/dL Final   • Creatinine 05/17/2019 0.90  0.50 - 1.40 mg/dL Final   • Calcium 05/17/2019 9.4  8.5 - 10.5 mg/dL Final   • AST(SGOT) 05/17/2019 18  12 - 45 U/L Final   • ALT(SGPT) 05/17/2019 28  2 - 50 U/L Final   • Alkaline Phosphatase 05/17/2019 56  30 - 99 U/L Final   • Total Bilirubin 05/17/2019 1.1  0.1 - 1.5  mg/dL Final   • Albumin 05/17/2019 4.2  3.2 - 4.9 g/dL Final   • Total Protein 05/17/2019 6.8  6.0 - 8.2 g/dL Final   • Globulin 05/17/2019 2.6  1.9 - 3.5 g/dL Final   • A-G Ratio 05/17/2019 1.6  g/dL Final   • 25-Hydroxy   Vitamin D 25 05/17/2019 38  30 - 100 ng/mL Final   • Uric Acid 05/17/2019 6.2  2.5 - 8.3 mg/dL Final   • Fasting Status 05/17/2019 Fasting   Final   • GFR If  05/17/2019 >60  >60 mL/min/1.73 m 2 Final   • GFR If Non  05/17/2019 >60  >60 mL/min/1.73 m 2 Final       Assessment/Plan:     1. Essential hypertension  This is a chronic issue for the patient. He continues to take his medications without any complaints. His blood pressure is elevated at 160/80 today and repeat blood pressure is 160 over 80 this is likely secondary to his pain. Encouraged patient to avoid excessive salt intake and to maintain a healthy diet with routine exercise.  Advised weight loss.  I will recheck in 2 months.  Monitor blood pressure at home and keep a record.  We will adjust medication if persistently elevated at that time.    2. Dyslipidemia  Patient's most recent labs are all at target. Continue taking cholesterol medication. No myalgias. Encouraged the importance of maintain a healthy diet and exercise.     3. Impaired fasting blood sugar  Fasting blood sugar is still borderline elevated but nondiabetic level at 113. His A1C remains elevated at 5.8.  Advised to continue to avoid sweets and decrease the carbs as well as alcohol.  Advised regular exercise and keeping a healthy weight    4. Gout, arthritis  Uric acid level at 6.2.  Ideally this should be below 6.0.  He has not had any gout attacks in a while so we will keep him on the same dose of allopurinol.  Advised avoidance of foods high in uric acid.  Avoid alcohol which causes most of the gout flareup.    5. Mass of neck  This is a new complaint to me today. The mass appears consistent with a cyst. Ultrasound of the neck has been  ordered.  Further recommendation will depend on results.  - US-SOFT TISSUES OF HEAD - NECK; Future    6.  Abnormal ankle-brachial index  Elevated CHARLI right lower extremity on [employer] screening.  Patient however without symptoms of claudication.  Foot exam with good pedal pulse and no other abnormalities.  No further intervention needed.    7.  Obesity (BMI 30-39.9)  Patient's body mass index is 31.39 kg/m². Exercise and nutrition counseling were performed at this visit.  Hesham OSWALD (Rain), am scribing for, and in the presence of, Bonnie Olsen MD     Electronically signed by: Hesham Jade (Rain), 5/23/2019    Bonnie OSWALD MD personally performed the services described in this documentation, as scribed by Hesham Jade in my presence, and it is both accurate and complete.

## 2019-06-13 ENCOUNTER — HOSPITAL ENCOUNTER (OUTPATIENT)
Dept: RADIOLOGY | Facility: MEDICAL CENTER | Age: 74
End: 2019-06-13
Attending: FAMILY MEDICINE
Payer: MEDICARE

## 2019-06-13 DIAGNOSIS — R22.1 MASS OF NECK: ICD-10-CM

## 2019-06-13 PROCEDURE — 76536 US EXAM OF HEAD AND NECK: CPT

## 2019-07-05 ENCOUNTER — APPOINTMENT (OUTPATIENT)
Dept: ADMISSIONS | Facility: MEDICAL CENTER | Age: 74
End: 2019-07-05
Payer: MEDICARE

## 2019-07-11 DIAGNOSIS — Z01.810 PRE-OPERATIVE CARDIOVASCULAR EXAMINATION: ICD-10-CM

## 2019-07-11 DIAGNOSIS — Z01.812 PRE-OPERATIVE LABORATORY EXAMINATION: ICD-10-CM

## 2019-07-11 LAB
ANION GAP SERPL CALC-SCNC: 8 MMOL/L (ref 0–11.9)
BUN SERPL-MCNC: 19 MG/DL (ref 8–22)
CALCIUM SERPL-MCNC: 10.4 MG/DL (ref 8.5–10.5)
CHLORIDE SERPL-SCNC: 105 MMOL/L (ref 96–112)
CO2 SERPL-SCNC: 28 MMOL/L (ref 20–33)
CREAT SERPL-MCNC: 0.8 MG/DL (ref 0.5–1.4)
GLUCOSE SERPL-MCNC: 106 MG/DL (ref 65–99)
POTASSIUM SERPL-SCNC: 3.8 MMOL/L (ref 3.6–5.5)
SODIUM SERPL-SCNC: 141 MMOL/L (ref 135–145)

## 2019-07-11 PROCEDURE — 36415 COLL VENOUS BLD VENIPUNCTURE: CPT

## 2019-07-11 PROCEDURE — 80048 BASIC METABOLIC PNL TOTAL CA: CPT

## 2019-07-11 RX ORDER — M-VIT,TX,IRON,MINS/CALC/FOLIC 27MG-0.4MG
1 TABLET ORAL DAILY
COMMUNITY

## 2019-07-17 ENCOUNTER — ANESTHESIA EVENT (OUTPATIENT)
Dept: SURGERY | Facility: MEDICAL CENTER | Age: 74
End: 2019-07-17
Payer: MEDICARE

## 2019-07-18 ENCOUNTER — HOSPITAL ENCOUNTER (OUTPATIENT)
Facility: MEDICAL CENTER | Age: 74
End: 2019-07-18
Attending: SURGERY | Admitting: SURGERY
Payer: MEDICARE

## 2019-07-18 ENCOUNTER — ANESTHESIA (OUTPATIENT)
Dept: SURGERY | Facility: MEDICAL CENTER | Age: 74
End: 2019-07-18
Payer: MEDICARE

## 2019-07-18 VITALS
TEMPERATURE: 98.4 F | DIASTOLIC BLOOD PRESSURE: 67 MMHG | SYSTOLIC BLOOD PRESSURE: 160 MMHG | HEART RATE: 68 BPM | OXYGEN SATURATION: 97 % | HEIGHT: 72 IN | RESPIRATION RATE: 16 BRPM | BODY MASS INDEX: 30.64 KG/M2 | WEIGHT: 226.19 LBS

## 2019-07-18 LAB
EKG IMPRESSION: NORMAL
ERYTHROCYTE [DISTWIDTH] IN BLOOD BY AUTOMATED COUNT: 41.8 FL (ref 35.9–50)
HCT VFR BLD AUTO: 43.5 % (ref 42–52)
HGB BLD-MCNC: 15.1 G/DL (ref 14–18)
MCH RBC QN AUTO: 30.5 PG (ref 27–33)
MCHC RBC AUTO-ENTMCNC: 34.7 G/DL (ref 33.7–35.3)
MCV RBC AUTO: 87.9 FL (ref 81.4–97.8)
PLATELET # BLD AUTO: 227 K/UL (ref 164–446)
PMV BLD AUTO: 9.7 FL (ref 9–12.9)
RBC # BLD AUTO: 4.95 M/UL (ref 4.7–6.1)
WBC # BLD AUTO: 6.1 K/UL (ref 4.8–10.8)

## 2019-07-18 PROCEDURE — 160046 HCHG PACU - 1ST 60 MINS PHASE II: Performed by: SURGERY

## 2019-07-18 PROCEDURE — 700102 HCHG RX REV CODE 250 W/ 637 OVERRIDE(OP)

## 2019-07-18 PROCEDURE — A6402 STERILE GAUZE <= 16 SQ IN: HCPCS | Performed by: SURGERY

## 2019-07-18 PROCEDURE — 85027 COMPLETE CBC AUTOMATED: CPT

## 2019-07-18 PROCEDURE — 93010 ELECTROCARDIOGRAM REPORT: CPT | Performed by: INTERNAL MEDICINE

## 2019-07-18 PROCEDURE — 160027 HCHG SURGERY MINUTES - 1ST 30 MINS LEVEL 2: Performed by: SURGERY

## 2019-07-18 PROCEDURE — 700101 HCHG RX REV CODE 250: Performed by: ANESTHESIOLOGY

## 2019-07-18 PROCEDURE — 160035 HCHG PACU - 1ST 60 MINS PHASE I: Performed by: SURGERY

## 2019-07-18 PROCEDURE — A9270 NON-COVERED ITEM OR SERVICE: HCPCS

## 2019-07-18 PROCEDURE — C1781 MESH (IMPLANTABLE): HCPCS | Performed by: SURGERY

## 2019-07-18 PROCEDURE — 700111 HCHG RX REV CODE 636 W/ 250 OVERRIDE (IP): Performed by: ANESTHESIOLOGY

## 2019-07-18 PROCEDURE — 160009 HCHG ANES TIME/MIN: Performed by: SURGERY

## 2019-07-18 PROCEDURE — 700111 HCHG RX REV CODE 636 W/ 250 OVERRIDE (IP): Performed by: SURGERY

## 2019-07-18 PROCEDURE — 501838 HCHG SUTURE GENERAL: Performed by: SURGERY

## 2019-07-18 PROCEDURE — 700105 HCHG RX REV CODE 258: Performed by: SURGERY

## 2019-07-18 PROCEDURE — 160002 HCHG RECOVERY MINUTES (STAT): Performed by: SURGERY

## 2019-07-18 PROCEDURE — 160047 HCHG PACU  - EA ADDL 30 MINS PHASE II: Performed by: SURGERY

## 2019-07-18 PROCEDURE — 160036 HCHG PACU - EA ADDL 30 MINS PHASE I: Performed by: SURGERY

## 2019-07-18 PROCEDURE — 93005 ELECTROCARDIOGRAM TRACING: CPT | Performed by: SURGERY

## 2019-07-18 PROCEDURE — 700102 HCHG RX REV CODE 250 W/ 637 OVERRIDE(OP): Performed by: ANESTHESIOLOGY

## 2019-07-18 PROCEDURE — 160048 HCHG OR STATISTICAL LEVEL 1-5: Performed by: SURGERY

## 2019-07-18 PROCEDURE — 160038 HCHG SURGERY MINUTES - EA ADDL 1 MIN LEVEL 2: Performed by: SURGERY

## 2019-07-18 PROCEDURE — 160025 RECOVERY II MINUTES (STATS): Performed by: SURGERY

## 2019-07-18 PROCEDURE — A9270 NON-COVERED ITEM OR SERVICE: HCPCS | Performed by: ANESTHESIOLOGY

## 2019-07-18 DEVICE — MESH VENTRALEX ST W/STRAP - 6.4CM MEDIUM (1EA/CA): Type: IMPLANTABLE DEVICE | Status: FUNCTIONAL

## 2019-07-18 RX ORDER — ONDANSETRON 2 MG/ML
INJECTION INTRAMUSCULAR; INTRAVENOUS PRN
Status: DISCONTINUED | OUTPATIENT
Start: 2019-07-18 | End: 2019-07-18 | Stop reason: SURG

## 2019-07-18 RX ORDER — BUPIVACAINE HYDROCHLORIDE 2.5 MG/ML
INJECTION, SOLUTION EPIDURAL; INFILTRATION; INTRACAUDAL
Status: DISCONTINUED | OUTPATIENT
Start: 2019-07-18 | End: 2019-07-18 | Stop reason: HOSPADM

## 2019-07-18 RX ORDER — MORPHINE SULFATE 4 MG/ML
2 INJECTION, SOLUTION INTRAMUSCULAR; INTRAVENOUS
Status: DISCONTINUED | OUTPATIENT
Start: 2019-07-18 | End: 2019-07-18 | Stop reason: HOSPADM

## 2019-07-18 RX ORDER — CEFAZOLIN SODIUM 1 G/3ML
INJECTION, POWDER, FOR SOLUTION INTRAMUSCULAR; INTRAVENOUS PRN
Status: DISCONTINUED | OUTPATIENT
Start: 2019-07-18 | End: 2019-07-18 | Stop reason: SURG

## 2019-07-18 RX ORDER — DEXAMETHASONE SODIUM PHOSPHATE 4 MG/ML
INJECTION, SOLUTION INTRA-ARTICULAR; INTRALESIONAL; INTRAMUSCULAR; INTRAVENOUS; SOFT TISSUE PRN
Status: DISCONTINUED | OUTPATIENT
Start: 2019-07-18 | End: 2019-07-18 | Stop reason: SURG

## 2019-07-18 RX ORDER — KETOROLAC TROMETHAMINE 30 MG/ML
INJECTION, SOLUTION INTRAMUSCULAR; INTRAVENOUS PRN
Status: DISCONTINUED | OUTPATIENT
Start: 2019-07-18 | End: 2019-07-18 | Stop reason: SURG

## 2019-07-18 RX ORDER — HYDRALAZINE HYDROCHLORIDE 20 MG/ML
5 INJECTION INTRAMUSCULAR; INTRAVENOUS
Status: DISCONTINUED | OUTPATIENT
Start: 2019-07-18 | End: 2019-07-18 | Stop reason: HOSPADM

## 2019-07-18 RX ORDER — TRAMADOL HYDROCHLORIDE 50 MG/1
50 TABLET ORAL EVERY 6 HOURS PRN
Status: DISCONTINUED | OUTPATIENT
Start: 2019-07-18 | End: 2019-07-18 | Stop reason: HOSPADM

## 2019-07-18 RX ORDER — DOCUSATE SODIUM 100 MG/1
100 CAPSULE, LIQUID FILLED ORAL
COMMUNITY

## 2019-07-18 RX ORDER — SODIUM CHLORIDE, SODIUM LACTATE, POTASSIUM CHLORIDE, CALCIUM CHLORIDE 600; 310; 30; 20 MG/100ML; MG/100ML; MG/100ML; MG/100ML
INJECTION, SOLUTION INTRAVENOUS CONTINUOUS
Status: DISCONTINUED | OUTPATIENT
Start: 2019-07-18 | End: 2019-07-18 | Stop reason: HOSPADM

## 2019-07-18 RX ORDER — MORPHINE SULFATE 4 MG/ML
1 INJECTION, SOLUTION INTRAMUSCULAR; INTRAVENOUS
Status: DISCONTINUED | OUTPATIENT
Start: 2019-07-18 | End: 2019-07-18 | Stop reason: HOSPADM

## 2019-07-18 RX ORDER — SODIUM CHLORIDE 9 MG/ML
INJECTION, SOLUTION INTRAVENOUS CONTINUOUS
Status: DISCONTINUED | OUTPATIENT
Start: 2019-07-18 | End: 2019-07-18 | Stop reason: HOSPADM

## 2019-07-18 RX ORDER — ONDANSETRON 2 MG/ML
4 INJECTION INTRAMUSCULAR; INTRAVENOUS
Status: DISCONTINUED | OUTPATIENT
Start: 2019-07-18 | End: 2019-07-18 | Stop reason: HOSPADM

## 2019-07-18 RX ORDER — HALOPERIDOL 5 MG/ML
1 INJECTION INTRAMUSCULAR
Status: DISCONTINUED | OUTPATIENT
Start: 2019-07-18 | End: 2019-07-18 | Stop reason: HOSPADM

## 2019-07-18 RX ORDER — MORPHINE SULFATE 10 MG/ML
5 INJECTION, SOLUTION INTRAMUSCULAR; INTRAVENOUS
Status: DISCONTINUED | OUTPATIENT
Start: 2019-07-18 | End: 2019-07-18 | Stop reason: HOSPADM

## 2019-07-18 RX ADMIN — HYDROCODONE BITARTRATE AND ACETAMINOPHEN 15 ML: 7.5; 325 SOLUTION ORAL at 15:26

## 2019-07-18 RX ADMIN — HYDROCODONE BITARTRATE AND ACETAMINOPHEN 15 ML: 7.5; 325 SOLUTION ORAL at 13:55

## 2019-07-18 RX ADMIN — CEFAZOLIN 2 G: 330 INJECTION, POWDER, FOR SOLUTION INTRAMUSCULAR; INTRAVENOUS at 12:11

## 2019-07-18 RX ADMIN — KETOROLAC TROMETHAMINE 15 MG: 30 INJECTION, SOLUTION INTRAMUSCULAR at 12:25

## 2019-07-18 RX ADMIN — FENTANYL CITRATE 100 MCG: 50 INJECTION, SOLUTION INTRAMUSCULAR; INTRAVENOUS at 12:08

## 2019-07-18 RX ADMIN — ONDANSETRON 4 MG: 2 INJECTION INTRAMUSCULAR; INTRAVENOUS at 12:25

## 2019-07-18 RX ADMIN — SODIUM CHLORIDE, POTASSIUM CHLORIDE, SODIUM LACTATE AND CALCIUM CHLORIDE: 600; 310; 30; 20 INJECTION, SOLUTION INTRAVENOUS at 11:17

## 2019-07-18 RX ADMIN — DEXAMETHASONE SODIUM PHOSPHATE 4 MG: 4 INJECTION, SOLUTION INTRA-ARTICULAR; INTRALESIONAL; INTRAMUSCULAR; INTRAVENOUS; SOFT TISSUE at 12:17

## 2019-07-18 RX ADMIN — FENTANYL CITRATE 50 MCG: 50 INJECTION, SOLUTION INTRAMUSCULAR; INTRAVENOUS at 12:20

## 2019-07-18 RX ADMIN — LIDOCAINE HYDROCHLORIDE 100 MG: 20 INJECTION, SOLUTION INTRAVENOUS at 12:08

## 2019-07-18 RX ADMIN — SODIUM CHLORIDE, POTASSIUM CHLORIDE, SODIUM LACTATE AND CALCIUM CHLORIDE: 600; 310; 30; 20 INJECTION, SOLUTION INTRAVENOUS at 12:02

## 2019-07-18 RX ADMIN — PROPOFOL 200 MG: 10 INJECTION, EMULSION INTRAVENOUS at 12:08

## 2019-07-18 ASSESSMENT — PAIN SCALES - GENERAL: PAIN_LEVEL: 3

## 2019-07-18 NOTE — ANESTHESIA POSTPROCEDURE EVALUATION
Patient: Sam Ayala    Procedure Summary     Date:  07/18/19 Room / Location:  [facility] OR 08 / SURGERY [facility]    Anesthesia Start:  1202 Anesthesia Stop:  1242    Procedure:  REPAIR, HERNIA, UMBILICAL (N/A Abdomen) Diagnosis:  (umbilical hernia)    Surgeon:  Priscila Huggins M.D. Responsible Provider:  Apryl Donohue    Anesthesia Type:  general ASA Status:  2          Final Anesthesia Type: general  Last vitals  BP   Blood Pressure : 160/67, NIBP: 144/92    Temp   37 °C (98.6 °F)    Pulse   Pulse: (!) 57, Heart Rate (Monitored): (!) 56   Resp   14    SpO2   93 %      Anesthesia Post Evaluation    Patient location during evaluation: PACU  Patient participation: complete - patient participated  Level of consciousness: awake and alert  Pain score: 3    Airway patency: patent  Anesthetic complications: no  Cardiovascular status: hemodynamically stable  Respiratory status: acceptable  Hydration status: euvolemic    PONV: none           Nurse Pain Score: 3 (NPRS)

## 2019-07-18 NOTE — OR NURSING
POC reviewed with pt. Call light within reach, educated to call for assistance, hourly rounding in place, bed in lowest position and locked.

## 2019-07-18 NOTE — OR NURSING
Pt arrived to phase II, pt states pain is 5/10 but is tolerable enough to go home. Pt up to the bathroom and able to void without difficulty. D/C instructions given to pt and family, verbalized understanding. PIV removed prior to D/C.

## 2019-07-18 NOTE — OR NURSING
Pt arrived from OR.  Pt tolerated procedure and anesthesia without problems.  Oral airway removed by anesthesia a few minutes after arriving.  Handoff given.  Pt awakens to voice.  Opens eyes, follows commands.  VSS.      Pt tolerated recovery phase.  Pain managed by cold packs and oral pain meds.  Family updated.  Pt voided 400cc per urinal before going to Phase II.  Pt tolerated joanna crackers and cranberry juice.  Denies nausea.      Umbilical incision covered with gauze and occlusive dressing.  CDI.  Report called to Phase II nurse.  Pt transferred to room in Phase II for discharge without incident by stretcher.      Sonny Phipps RN BSN

## 2019-07-18 NOTE — ANESTHESIA PROCEDURE NOTES
Airway  Date/Time: 7/18/2019 12:10 PM  Performed by: FLACO ELLIS  Authorized by: FLACO ELLIS     Location:  OR  Urgency:  Elective  Indications for Airway Management:  Anesthesia  Spontaneous Ventilation: absent    Sedation Level:  Deep  Preoxygenated: Yes    Mask Difficulty Assessment:  2 - vent by mask + OA or adjuvant +/- NMBA  Final Airway Type:  Supraglottic airway  Final Supraglottic Airway:  Standard LMA  SGA Size:  5  Number of Attempts at Approach:  1

## 2019-07-18 NOTE — OP REPORT
DATE OF SERVICE:  07/18/2019    PREOPERATIVE DIAGNOSIS:  Recurrent umbilical hernia.    POSTOPERATIVE DIAGNOSIS:  Recurrent umbilical hernia.    PROCEDURE:  Umbilical herniorrhaphy with Ventralex mesh.    SURGEON:  Priscila Acosta MD    ASSISTANT:  BRAYAN Lima    ANESTHESIA:  General endotracheal.    ANESTHESIOLOGIST:  Apryl Donohue MD    INDICATIONS:  The patient is a 74-year-old gentleman who has had multiple   umbilical hernias with a laparoscopic hernia repair done by Dr. Fernandez as well   as a laparoscopic appendectomy by me.  He had developed recurrent umbilical   hernia since we could not use mesh in the face of ____ appendicitis.  He is   being brought at time now for umbilical hernia repair with mesh.    FINDINGS:  Approximately 2 cm defect was repaired with a Ventralex mesh and   then the fascia was closed over it.    DESCRIPTION OF PROCEDURE:  Patient was identified and consented, he was   brought to the operating room and placed in supine position.  Patient   underwent laryngeal mask anesthetic clearance.  Patient's abdomen is prepped   and draped in sterile fashion.  A curvilinear incision was made in the   infraumbilical line using electrocautery.  Subcutaneous tissue was dissected   down to the fascial defect, was defined circumferentially.  A piece of   Ventralex mesh was brought in the field and sewn to fit the defect using 0   Ethibond.  Once that was completed, the fascia was closed over with 0   Ethibonds.  The subcutaneous tissue was approximated with 3-0 Vicryl.  Skin   was closed with running 4-0 in subcuticular fashion.  Op-Site dressing placed   on the wound.  Patient was extubated and taken to recovery room in stable   condition.  All sponge and needle counts were correct.       ____________________________________     PRISCILA ACOSTA MD    [facility] / NTS    DD:  07/18/2019 12:30:19  DT:  07/18/2019 13:48:20    D#:  [id number]  Job#:  [id number]    cc: Bonnie Olsen MD, Apryl Donohue  MD

## 2019-07-18 NOTE — DISCHARGE INSTRUCTIONS
ACTIVITY: Rest and take it easy for the first 24 hours.  A responsible adult is recommended to remain with you during that time.  It is normal to feel sleepy.  We encourage you to not do anything that requires balance, judgment or coordination.    MILD FLU-LIKE SYMPTOMS ARE NORMAL. YOU MAY EXPERIENCE GENERALIZED MUSCLE ACHES, THROAT IRRITATION, HEADACHE AND/OR SOME NAUSEA.    FOR 24 HOURS DO NOT:  Drive, operate machinery or run household appliances.  Drink beer or alcoholic beverages.   Make important decisions or sign legal documents.    SPECIAL INSTRUCTIONS:     ACTIVITIES: Upon discharge from the hospital, the day of surgery it is requested that you do no significant physical activity and limit mental activities, as you have had sedation. The day after surgery, you may resume activities of daily living, but for four weeks, it is recommended that you do no strenuous activities or heavy lifting (greater than 15 pounds).     DRIVING: You may drive whenever you are off pain medications and are able to perform the activities needed to drive, i.e. turning, bending, twisting, etc.     WOUND: It is not unusual for patients to experience swelling and even bruising at the hernia repair site.     ICE: please use ice on the wound to decrease the swelling for the first 24 hours and then discontinue.     BATHING: The dressing can be removed two days after surgery and the wound can then be wetted in a shower as normal, but avoid submersion in water (tub bath) for at least 2 weeks.     PAIN MEDICATION: You will be given a prescription for pain medication at discharge. Please take these as directed. It is important to remember not to take medications on an empty stomach as this may cause nausea.     BOWEL FUNCTION: After hernia repair, it is not uncommon for patients to experience constipation. This is due to decreasing activity levels as well as pain medications. You may wish to use a stool softener beginning immediately  after surgery, and you may or may not need to use a laxative (Milk of Magnesia, Ex-lax; Senokot, etc.) as well.            CALL IF YOU HAVE: (1) Fevers to more than 1010 F, (2) Unusual chest or leg pain, (3) Drainage or fluid from incision that may be foul smelling, increased tenderness or soreness at the wound or the wound edges are no longer together,redness or swelling at the incision site. Please do not hesitate to call with any other questions.     APPOINTMENT: Contact our office at [phone] for a follow-up appointment in 2 weeks following your procedure.     If you have any additional questions, please do not hesitate to call the office.       Office address:   [address] [place], [place] [address]e [facility], [address] [facility] [place] [address]    DIET: To avoid nausea, slowly advance diet as tolerated, avoiding spicy or greasy foods for the first day.  Add more substantial food to your diet according to your physician's instructions.  Babies can be fed formula or breast milk as soon as they are hungry.  INCREASE FLUIDS AND FIBER TO AVOID CONSTIPATION.    SURGICAL DRESSING/BATHING: See above. Call MD with any questions.     FOLLOW-UP APPOINTMENT:  A follow-up appointment should be arranged with your doctor in 1-2 weeks; call to schedule.    You should CALL YOUR PHYSICIAN if you develop:  Fever greater than 101 degrees F.  Pain not relieved by medication, or persistent nausea or vomiting.  Excessive bleeding (blood soaking through dressing) or unexpected drainage from the wound.  Extreme redness or swelling around the incision site, drainage of pus or foul smelling drainage.  Inability to urinate or empty your bladder within 8 hours.  Problems with breathing or chest pain.    You should call 911 if you develop problems with breathing or chest pain.  If you are unable to contact your doctor or surgical center, you should go to the nearest emergency room or urgent care center.  Physician's telephone #: [phone]    If any questions arise, call your  doctor.  If your doctor is not available, please feel free to call the Surgical Center at [phone].  The Center is open Monday through Friday from 7AM to 7PM.  You can also call the HEALTH HOTLINE open 24 hours/day, 7 days/week and speak to a nurse at [phone], or toll free at [phone].    A registered nurse may call you a few days after your surgery to see how you are doing after your procedure.    MEDICATIONS: Resume taking daily medication.  Take prescribed pain medication with food.  If no medication is prescribed, you may take non-aspirin pain medication if needed.  PAIN MEDICATION CAN BE VERY CONSTIPATING.  Take a stool softener or laxative such as senokot, pericolace, or milk of magnesia if needed.    Prescription given for Tramadol.  Last pain medication given at 1:55pm (Can have next pain medication at                                   )    If your physician has prescribed pain medication that includes Acetaminophen (Tylenol), do not take additional Acetaminophen (Tylenol) while taking the prescribed medication.    Depression / Suicide Risk    As you are discharged from this [facility] facility, it is important to learn how to keep safe from harming yourself.    Recognize the warning signs:  · Abrupt changes in personality, positive or negative- including increase in energy   · Giving away possessions  · Change in eating patterns- significant weight changes-  positive or negative  · Change in sleeping patterns- unable to sleep or sleeping all the time   · Unwillingness or inability to communicate  · Depression  · Unusual sadness, discouragement and loneliness  · Talk of wanting to die  · Neglect of personal appearance   · Rebelliousness- reckless behavior  · Withdrawal from people/activities they love  · Confusion- inability to concentrate     If you or a loved one observes any of these behaviors or has concerns about self-harm, here's what you can do:  · Talk about it- your feelings and  reasons for harming yourself  · Remove any means that you might use to hurt yourself (examples: pills, rope, extension cords, firearm)  · Get professional help from the community (Mental Health, Substance Abuse, psychological counseling)  · Do not be alone:Call your Safe Contact- someone whom you trust who will be there for you.  · Call your local CRISIS HOTLINE 519-8524 or 531-940-0494  · Call your local Children's Mobile Crisis Response Team [place] (721) 214-5888 or www."[employer]  · Call the toll free National Suicide Prevention Hotlines   · National Suicide Prevention Lifeline 681-225-BFNL (2331)  · National Hope Line Network 800-SUICIDE (735-6451)

## 2019-07-18 NOTE — ANESTHESIA QCDR
2019 [facility] Clinical Data Registry (for Quality Improvement)     Postoperative nausea/vomiting risk protocol (Adult = 18 yrs and Pediatric 3-17 yrs)- (430 and 463)  General inhalation anesthetic (NOT TIVA) with PONV risk factors: No  Provision of anti-emetic therapy with at least 2 different classes of agents: N/A  Patient DID NOT receive anti-emetic therapy and reason is documented in Medical Record: N/A    Multimodal Pain Management- (AQI59)  Patient undergoing Elective Surgery (i.e. Outpatient, or ASC, or Prescheduled Surgery prior to Hospital Admission): Yes  Use of Multimodal Pain Management, two or more drugs and/or interventions, NOT including systemic opioids: Yes   Exception: Documented allergy to multiple classes of analgesics:  N/A    PACU assessment of acute postoperative pain prior to Anesthesia Care End- Applies to Patients Age = 18- (ABG7)  Initial PACU pain score is which of the following: < 7/10  Patient unable to report pain score: N/A    Post-anesthetic transfer of care checklist/protocol to PACU/ICU- (426 and 427)  Upon conclusion of case, patient transferred to which of the following locations: PACU/Non-ICU  Use of transfer checklist/protocol: Yes  Exclusion: Service Performed in Patient Hospital Room (and thus did not require transfer): N/A    PACU Reintubation- (AQI31)  General anesthesia requiring endotracheal intubation (ETT) along with subsequent extubation in OR or PACU: No  Required reintubation in the PACU: N/A  Extubation was a planned trial documented in the medical record prior to removal of the original airway device: N/A    Unplanned admission to ICU related to anesthesia service up through end of PACU care- (MD51)  Unplanned admission to ICU (not initially anticipated at anesthesia start time): No

## 2019-07-18 NOTE — ANESTHESIA TIME REPORT
Anesthesia Start and Stop Event Times     Date Time Event    7/18/2019 1202 Anesthesia Start     1242 Anesthesia Stop        Responsible Staff  07/18/19    Name Role Begin End    Apryl Donohue Anesth 1202 1242        Preop Diagnosis (Free Text):  Pre-op Diagnosis     UMBILICAL HERNIA         Preop Diagnosis (Codes):  Diagnosis Information     Diagnosis Code(s):         Post op Diagnosis  Umbilical hernia      Premium Reason  Non-Premium    Comments:

## 2019-07-18 NOTE — ANESTHESIA PREPROCEDURE EVALUATION
75 y/o male with HTN, KAYLIE, obesity, here for umbilical hernia repair. Past surgeries without anesthesia problems. No h/o MI, SOB, CP, METS > 4. Former smoker.    Relevant Problems   (+) BMI 29.0-29.9,adult   (+) Chronic low back pain   (+) Essential hypertension   (+) HTN (hypertension)   (+) KAYLIE (obstructive sleep apnea)      Pertinent Negatives   (-) Difficult intubation   (-) Malignant hyperthermia   (-) PONV (postoperative nausea and vomiting)       Physical Exam    Airway   Mallampati: II  TM distance: >3 FB  Neck ROM: full       Cardiovascular - normal exam  Rhythm: regular  Rate: normal     Dental - normal exam         Pulmonary - normal exam  Breath sounds clear to auscultation     Abdominal    Neurological - normal exam                 Anesthesia Plan    ASA 2       Plan - general       Airway plan will be LMA        Induction: intravenous    Postoperative Plan: Postoperative administration of opioids is intended.        Informed Consent:    Anesthetic plan and risks discussed with patient.

## 2019-07-25 ENCOUNTER — OFFICE VISIT (OUTPATIENT)
Dept: MEDICAL GROUP | Facility: PHYSICIAN GROUP | Age: 74
End: 2019-07-25
Payer: MEDICARE

## 2019-07-25 VITALS
HEART RATE: 91 BPM | SYSTOLIC BLOOD PRESSURE: 130 MMHG | DIASTOLIC BLOOD PRESSURE: 70 MMHG | WEIGHT: 226.19 LBS | OXYGEN SATURATION: 95 % | HEIGHT: 72 IN | TEMPERATURE: 98.4 F | BODY MASS INDEX: 30.64 KG/M2

## 2019-07-25 DIAGNOSIS — I10 ESSENTIAL HYPERTENSION: ICD-10-CM

## 2019-07-25 DIAGNOSIS — Z12.5 SCREENING FOR PROSTATE CANCER: ICD-10-CM

## 2019-07-25 DIAGNOSIS — E78.5 DYSLIPIDEMIA: ICD-10-CM

## 2019-07-25 DIAGNOSIS — Z11.59 NEED FOR HEPATITIS C SCREENING TEST: ICD-10-CM

## 2019-07-25 DIAGNOSIS — R73.01 IMPAIRED FASTING BLOOD SUGAR: ICD-10-CM

## 2019-07-25 DIAGNOSIS — D17.0 LIPOMA OF NECK: ICD-10-CM

## 2019-07-25 PROCEDURE — 99214 OFFICE O/P EST MOD 30 MIN: CPT | Performed by: FAMILY MEDICINE

## 2019-07-25 ASSESSMENT — PATIENT HEALTH QUESTIONNAIRE - PHQ9: CLINICAL INTERPRETATION OF PHQ2 SCORE: 0

## 2019-07-25 NOTE — PROGRESS NOTES
Subjective:      Sam Aylaa is a 74 y.o. male who presents with Hypertension        HPI:    Patient is here for follow up of his medical problems.     Essential hypertension  Chronic. Patient is currently on losartan-HCTZ, metoprolol, and amlodipine. Patient's brought in his home blood pressure log which showed most readings around 120s-130s/60s-70s with occasional systolic reading of 140 and one systolic reading of 160 which was the highest reading. Initial blood pressure reading in clinic today was 140/60 with repeat reading of 130/70. He brought in his home wrist blood pressure machine which gave a systolic reading of 145. Patient states he was drinking soda and cold brew coffee for period of time and noticed increased blood pressure readings, prompting him to stop drinking these beverages. Patient mentions that he received 4 cortisone injections to his left hand by Dr. Martínez at [facility] and was told that it may raise his blood pressure.     Dyslipidemia  Chronic. Patient continues to take atorvastatin. No reports of medication side effects such as myalgias.     Impaired fasting blood sugar  Chronic. Patient continues to manage this with his own efforts.      Screening for prostate cancer  Patient is due for yearly PSA screening.     Need for hepatitis C screening test  Patient agrees to do hepatitis C screening, per CDC guidelines.     Lipoma of neck  Patient continues to have a lump on his left anterior neck. He does not report any active pain or recent increase in size of the lump.  He had an ultrasound done which was consistent with lipoma.      Patient notes that he had a umbilical hernia repair 1 week ago without complications. States he is doing well. He notes that he has a post-op appointment for this tomorrow.      Past medical history, past surgical history, family history reviewed-no changes    Social history reviewed-no changes    Allergies reviewed-no changes    Medications reviewed-no changes      ROS:  As per the HPI as shown above, the rest are negative.       Objective:     /70   Pulse 91   Temp 36.9 °C (98.4 °F) (Temporal)   Ht 1.829 m (6')   Wt 102.6 kg (226 lb 3.1 oz)   SpO2 95%   BMI 30.68 kg/m²     Physical Exam  Examined alert, awake, oriented, not in distress    Neck-supple, no lymphadenopathy, no thyromegaly. 1.5 x 1.5 cm mass on the left anterior neck which is the same in size compared to last visit.  Lungs-clear to auscultation, no rales, no wheezes  Heart-regular rate and rhythm, no murmur  Abdomen- healed surgical scar umbilicus without any erythema or signs of infection  Extremities-no edema, clubbing, cyanosis       Assessment/Plan:     1. Essential hypertension  - Tested patient's home wrist blood pressure monitor which showed significantly different readings compared to manual blood pressures taken in clinic. His wrist monitor gave systolic reading of 145, while manual blood pressure taken in clinic gave reading of 130/70.  Advised to use his arm machine.  He said he has 2 of them at home.  - Continue current plan of care and medications.   - Counseled patient on diet and exercise. Advised low sodium diet.     2. Dyslipidemia  - Stable at this time.  Last lipid panel May 2019 came back all at target.  Continue current plan of care and medications, atorvastatin.   - Advised to eat low fat, low carbohydrate and high fiber diet as well as do cardio physical exercise regularly.    - Lipid Profile; Future  - Comp Metabolic Panel; Future  - HEMOGLOBIN A1C; Future  - PROSTATE SPECIFIC AG SCREENING; Future  - HEP C VIRUS ANTIBODY; Future    3. Impaired fasting blood sugar  -Last fasting blood sugar 113 and hemoglobin A1c 5.8 in May 2019.  We will continue to monitor patient's blood sugar levels. He will continue managing this with his own efforts.  - Counseled patient on diet and exercise. Advised decreasing intake of carbohydrates and sugary foods and increasing intake of  vegetables.   - Lipid Profile; Future  - Comp Metabolic Panel; Future  - HEMOGLOBIN A1C; Future  - PROSTATE SPECIFIC AG SCREENING; Future  - HEP C VIRUS ANTIBODY; Future    4. Screening for prostate cancer  - Patient is due for annual PSA screening  - Lipid Profile; Future  - Comp Metabolic Panel; Future  - HEMOGLOBIN A1C; Future  - PROSTATE SPECIFIC AG SCREENING; Future  - HEP C VIRUS ANTIBODY; Future    5. Need for hepatitis C screening test  - Patient agrees to get hepatitis C screening test, per CDC guidelines.  - Lipid Profile; Future  - Comp Metabolic Panel; Future  - HEMOGLOBIN A1C; Future  - PROSTATE SPECIFIC AG SCREENING; Future  - HEP C VIRUS ANTIBODY; Future    6. Lipoma of neck  - Ultrasound was consistent with lipoma. Neck lipoma is unchanged in size today. Patient denies any associated symptoms or concerns regarding the lipoma.  Made him aware this is a benign growth.  Offered referral for surgical excision although not necessary since he is not having any symptoms.  He opted observation.  If the mass increases in size and causes discomfort or pain before his next visit he will let me know.      IDar (Oziele), am scribing for, and in the presence of, Bonnie Olsen MD     Electronically signed by: Dar Longoria (Rain), 7/25/2019    IBonnie MD personally performed the services described in this documentation, as scribed by Dar Longoria in my presence, and it is both accurate and complete.

## 2020-02-26 ENCOUNTER — APPOINTMENT (OUTPATIENT)
Dept: MEDICAL GROUP | Facility: PHYSICIAN GROUP | Age: 75
End: 2020-02-26

## 2021-01-15 DIAGNOSIS — Z23 NEED FOR VACCINATION: ICD-10-CM

## 2024-02-28 NOTE — TELEPHONE ENCOUNTER
Was the patient seen in the last year in this department? Yes     Does patient have an active prescription for medications requested? No     Received Request Via: Pharmacy   none

## (undated) DEVICE — GOWN SURGEONS X-LARGE - DISP. (30/CA)

## (undated) DEVICE — CANISTER SUCTION 3000ML MECHANICAL FILTER AUTO SHUTOFF MEDI-VAC NONSTERILE LF DISP  (40EA/CA)

## (undated) DEVICE — TUBING CLEARLINK DUO-VENT - C-FLO (48EA/CA)

## (undated) DEVICE — DRESSING TRANSPARENT FILM TEGADERM 2.375 X 2.75"  (100EA/BX)"

## (undated) DEVICE — ELECTRODE 850 FOAM ADHESIVE - HYDROGEL RADIOTRNSPRNT (50/PK)

## (undated) DEVICE — SODIUM CHL IRRIGATION 0.9% 1000ML (12EA/CA)

## (undated) DEVICE — SENSOR SPO2 NEO LNCS ADHESIVE (20/BX) SEE USER NOTES

## (undated) DEVICE — STAPLE 60MM WHTE 2.6MM - (12/BX) WAS PART #ECR60W

## (undated) DEVICE — GLOVE BIOGEL INDICATOR SZ 6.5 SURGICAL PF LTX - (50PR/BX 4BX/CA)

## (undated) DEVICE — PENCIL ELECTSURG 10FT BTN SWH - (50/CA)

## (undated) DEVICE — MASK ANESTHESIA ADULT  - (100/CA)

## (undated) DEVICE — SWABSTICK BENZOIN SINGLE (50EA/BX)

## (undated) DEVICE — GLOVE BIOGEL SZ 6.5 SURGICAL PF LTX (50PR/BX 4BX/CA)

## (undated) DEVICE — TROCARCANN&SEAL 5X55 ZTHREAD - 12/BX

## (undated) DEVICE — STERI STRIP COMPOUND BENZOIN - TINCTURE 0.6ML WITH APPLICATOR (40EA/BX)

## (undated) DEVICE — SPONGE GAUZESTER. 2X2 4-PL - (2/PK 50PK/BX 30BX/CS)

## (undated) DEVICE — PROTECTOR ULNA NERVE - (36PR/CA)

## (undated) DEVICE — SUTURE 0 ETHIBOND MO6 C/R - (12/BX) 8-18 INCH ETHICON

## (undated) DEVICE — SET EXTENSION WITH 2 PORTS (48EA/CA) ***PART #2C8610 IS A SUBSTITUTE*****

## (undated) DEVICE — GOWN WARMING STANDARD FLEX - (30/CA)

## (undated) DEVICE — ELECTRODE DUAL RETURN W/ CORD - (50/PK)

## (undated) DEVICE — SUTURE 4-0 VICRYL PLUSFS-1 - 27 INCH (36/BX)

## (undated) DEVICE — HEAD HOLDER JUNIOR/ADULT

## (undated) DEVICE — PACK LAP CHOLE OR - (2EA/CA)

## (undated) DEVICE — KIT ANESTHESIA W/CIRCUIT & 3/LT BAG W/FILTER (20EA/CA)

## (undated) DEVICE — SPONGE GAUZESTER 4 X 4 4PLY - (128PK/CA)

## (undated) DEVICE — SUCTION INSTRUMENT YANKAUER BULBOUS TIP W/O VENT (50EA/CA)

## (undated) DEVICE — LACTATED RINGERS INJ 1000 ML - (14EA/CA 60CA/PF)

## (undated) DEVICE — TROCAR Z THREAD 12 X 100 - BLADED (6/BX)

## (undated) DEVICE — NEEDLE INSFL 120MM 14GA VRRS - (20/BX)

## (undated) DEVICE — SET LEADWIRE 5 LEAD BEDSIDE DISPOSABLE ECG (1SET OF 5/EA)

## (undated) DEVICE — PAD GROUNDING BOVIE PEDS - (25/CA)

## (undated) DEVICE — SLEEVE, VASO, THIGH, MED

## (undated) DEVICE — BAG RETRIEVAL 10ML (10EA/BX)

## (undated) DEVICE — CLOSURE WOUND 1/4 X 4 (STERI - STRIP) (50/BX 4BX/CA)

## (undated) DEVICE — SET SUCTION/IRRIGATION WITH DISPOSABLE TIP (6/CA )PART #0250-070-520 IS A SUB

## (undated) DEVICE — BLADE SURGICAL #15 - (50/BX 3BX/CA)

## (undated) DEVICE — STAPLER 60MM ENDO SHORT ARTICULATING (3EA/BX)

## (undated) DEVICE — NEPTUNE 4 PORT MANIFOLD - (20/PK)

## (undated) DEVICE — SUTURE GENERAL

## (undated) DEVICE — ELECTRODE 5MM LHK LAPSCP STERILE DISP- MEGADYNE  (5/CA)

## (undated) DEVICE — TUBE E-T HI-LO CUFF 7.0MM (10EA/PK)

## (undated) DEVICE — DETERGENT RENUZYME PLUS 10 OZ PACKET (50/BX)

## (undated) DEVICE — PACK MINOR BASIN - (2EA/CA)

## (undated) DEVICE — TROCAR5X55 KII SHIELDED SYS - (6/BX)

## (undated) DEVICE — DRAPE LAPAROTOMY T SHEET - (12EA/CA)

## (undated) DEVICE — KIT ROOM DECONTAMINATION

## (undated) DEVICE — SUTURE 4-0 VICRYL PLUS FS-2 - 27 INCH (36/BX)

## (undated) DEVICE — SUTURE 3-0 VICRYL PLUS SH - 27 INCH (36/BX)

## (undated) DEVICE — STAPLER 5MM (6EA/BX)

## (undated) DEVICE — PAD LAP STERILE 18 X 18 - (5/PK 40PK/CA)